# Patient Record
Sex: FEMALE | Race: WHITE | Employment: FULL TIME | URBAN - METROPOLITAN AREA
[De-identification: names, ages, dates, MRNs, and addresses within clinical notes are randomized per-mention and may not be internally consistent; named-entity substitution may affect disease eponyms.]

---

## 2017-02-10 ENCOUNTER — HOSPITAL ENCOUNTER (OUTPATIENT)
Dept: LAB | Age: 57
Discharge: HOME | End: 2017-02-10
Payer: COMMERCIAL

## 2017-02-10 ENCOUNTER — OFFICE VISIT (OUTPATIENT)
Dept: FAMILY MEDICINE CLINIC | Facility: CLINIC | Age: 57
End: 2017-02-10

## 2017-02-10 VITALS
SYSTOLIC BLOOD PRESSURE: 134 MMHG | BODY MASS INDEX: 42.8 KG/M2 | HEART RATE: 80 BPM | WEIGHT: 282.4 LBS | HEIGHT: 68 IN | TEMPERATURE: 97.7 F | DIASTOLIC BLOOD PRESSURE: 80 MMHG | OXYGEN SATURATION: 99 % | RESPIRATION RATE: 16 BRPM

## 2017-02-10 DIAGNOSIS — E78.5 HYPERLIPIDEMIA, UNSPECIFIED HYPERLIPIDEMIA TYPE: ICD-10-CM

## 2017-02-10 DIAGNOSIS — G89.29 CHRONIC PAIN OF BOTH KNEES: ICD-10-CM

## 2017-02-10 DIAGNOSIS — E11.9 TYPE 2 DIABETES MELLITUS WITHOUT COMPLICATION, WITHOUT LONG-TERM CURRENT USE OF INSULIN (HCC): Primary | ICD-10-CM

## 2017-02-10 DIAGNOSIS — M25.561 CHRONIC PAIN OF BOTH KNEES: ICD-10-CM

## 2017-02-10 DIAGNOSIS — I10 ESSENTIAL HYPERTENSION: ICD-10-CM

## 2017-02-10 DIAGNOSIS — M25.562 CHRONIC PAIN OF BOTH KNEES: ICD-10-CM

## 2017-02-10 DIAGNOSIS — E11.9 TYPE 2 DIABETES MELLITUS WITHOUT COMPLICATION, WITHOUT LONG-TERM CURRENT USE OF INSULIN (HCC): ICD-10-CM

## 2017-02-10 LAB
ALBUMIN SERPL BCP-MCNC: 3.3 G/DL (ref 3.4–5)
ALBUMIN/GLOB SERPL: 0.8 {RATIO} (ref 0.8–1.7)
ALP SERPL-CCNC: 102 U/L (ref 45–117)
ALT SERPL-CCNC: 20 U/L (ref 13–56)
ANION GAP BLD CALC-SCNC: 10 MMOL/L (ref 3–18)
AST SERPL W P-5'-P-CCNC: 15 U/L (ref 15–37)
BASOPHILS # BLD AUTO: 0 K/UL (ref 0–0.06)
BASOPHILS # BLD: 0 % (ref 0–2)
BILIRUB SERPL-MCNC: 0.4 MG/DL (ref 0.2–1)
BUN SERPL-MCNC: 14 MG/DL (ref 7–18)
BUN/CREAT SERPL: 19 (ref 12–20)
CALCIUM SERPL-MCNC: 8.7 MG/DL (ref 8.5–10.1)
CHLORIDE SERPL-SCNC: 102 MMOL/L (ref 100–108)
CHOLEST SERPL-MCNC: 154 MG/DL
CO2 SERPL-SCNC: 26 MMOL/L (ref 21–32)
CREAT SERPL-MCNC: 0.72 MG/DL (ref 0.6–1.3)
DIFFERENTIAL METHOD BLD: ABNORMAL
EOSINOPHIL # BLD: 0.3 K/UL (ref 0–0.4)
EOSINOPHIL NFR BLD: 2 % (ref 0–5)
ERYTHROCYTE [DISTWIDTH] IN BLOOD BY AUTOMATED COUNT: 13.7 % (ref 11.6–14.5)
EST. AVERAGE GLUCOSE BLD GHB EST-MCNC: 157 MG/DL
GLOBULIN SER CALC-MCNC: 4.4 G/DL (ref 2–4)
GLUCOSE SERPL-MCNC: 147 MG/DL (ref 74–99)
HBA1C MFR BLD: 7.1 % (ref 4.2–5.6)
HCT VFR BLD AUTO: 42.8 % (ref 35–45)
HDLC SERPL-MCNC: 43 MG/DL (ref 40–60)
HDLC SERPL: 3.6 {RATIO} (ref 0–5)
HGB BLD-MCNC: 13.8 G/DL (ref 12–16)
LDLC SERPL CALC-MCNC: 92.4 MG/DL (ref 0–100)
LIPID PROFILE,FLP: NORMAL
LYMPHOCYTES # BLD AUTO: 18 % (ref 21–52)
LYMPHOCYTES # BLD: 2.1 K/UL (ref 0.9–3.6)
MCH RBC QN AUTO: 28 PG (ref 24–34)
MCHC RBC AUTO-ENTMCNC: 32.2 G/DL (ref 31–37)
MCV RBC AUTO: 87 FL (ref 74–97)
MONOCYTES # BLD: 0.7 K/UL (ref 0.05–1.2)
MONOCYTES NFR BLD AUTO: 6 % (ref 3–10)
NEUTS SEG # BLD: 8.5 K/UL (ref 1.8–8)
NEUTS SEG NFR BLD AUTO: 74 % (ref 40–73)
PLATELET # BLD AUTO: 369 K/UL (ref 135–420)
PMV BLD AUTO: 10.8 FL (ref 9.2–11.8)
POTASSIUM SERPL-SCNC: 4.6 MMOL/L (ref 3.5–5.5)
PROT SERPL-MCNC: 7.7 G/DL (ref 6.4–8.2)
RBC # BLD AUTO: 4.92 M/UL (ref 4.2–5.3)
RHEUMATOID FACT SER QL LA: NEGATIVE
SODIUM SERPL-SCNC: 138 MMOL/L (ref 136–145)
TRIGL SERPL-MCNC: 93 MG/DL (ref ?–150)
TSH SERPL DL<=0.05 MIU/L-ACNC: 1.61 UIU/ML (ref 0.36–3.74)
VLDLC SERPL CALC-MCNC: 18.6 MG/DL
WBC # BLD AUTO: 11.6 K/UL (ref 4.6–13.2)

## 2017-02-10 PROCEDURE — 86430 RHEUMATOID FACTOR TEST QUAL: CPT | Performed by: FAMILY MEDICINE

## 2017-02-10 PROCEDURE — 36415 COLL VENOUS BLD VENIPUNCTURE: CPT | Performed by: FAMILY MEDICINE

## 2017-02-10 PROCEDURE — 86038 ANTINUCLEAR ANTIBODIES: CPT | Performed by: FAMILY MEDICINE

## 2017-02-10 PROCEDURE — 80053 COMPREHEN METABOLIC PANEL: CPT | Performed by: FAMILY MEDICINE

## 2017-02-10 PROCEDURE — 85025 COMPLETE CBC W/AUTO DIFF WBC: CPT | Performed by: FAMILY MEDICINE

## 2017-02-10 PROCEDURE — 80061 LIPID PANEL: CPT | Performed by: FAMILY MEDICINE

## 2017-02-10 PROCEDURE — 83036 HEMOGLOBIN GLYCOSYLATED A1C: CPT | Performed by: FAMILY MEDICINE

## 2017-02-10 PROCEDURE — 84443 ASSAY THYROID STIM HORMONE: CPT | Performed by: FAMILY MEDICINE

## 2017-02-10 NOTE — PROGRESS NOTES
SUBJECTIVE:  Emelia Mcgowan is a 61y.o. year old female   Chief Complaint   Patient presents with    Knee Pain       History of Present Illness:   Patient is lost to f/u of Hypertension and DM again. She is still not taking her medications regularly. Her glucose is running good when she checks it. She says that her MS is doing well. She has moderate knee pain (Rt>.Lt) when she gets up and walks, for several months. It gets better after walking. No recent injury. No Systemic, Cardiovascular or Respiratory symptoms. Past Medical History   Diagnosis Date    Cephalgia     Hyperlipemia      mild    Hypertension     IFG (impaired fasting glucose)     MS (multiple sclerosis) (HCC)     Optic neuritis     Tendonitis of shoulder      Past Surgical History   Procedure Laterality Date    Hx hysterectomy       fibroids and endometriosis        Current Outpatient Prescriptions   Medication Sig    metoprolol succinate (TOPROL-XL) 100 mg tablet TAKE 1 TABLET BY MOUTH DAILY    lisinopril-hydrochlorothiazide (PRINZIDE, ZESTORETIC) 20-12.5 mg per tablet TAKE 1 TABLET BY MOUTH DAILY    Blood-Glucose Meter monitoring kit Re: DM .00. Check once daily.  glucose blood VI test strips (BLOOD GLUCOSE TEST) strip Re: DM .00. Check once daily.  Lancets misc Re: DM .00. Check once daily.  aspirin delayed-release 81 mg tablet Take  by mouth daily.  Cholecalciferol, Vitamin D3, (VITAMIN D3) 1,000 unit cap Take 1,000 Units by mouth daily.  dimethyl fumarate (TECFIDERA) 240 mg cpDR Take  by mouth two (2) times a day. No current facility-administered medications for this visit.         No Known Allergies     Family History   Problem Relation Age of Onset    Cancer Mother      breast    Diabetes Father     Hypertension Father     Heart Disease Father     Diabetes Sister     Diabetes Brother     Hypertension Sister         Social History   Substance Use Topics    Smoking status: Never Smoker    Smokeless tobacco: Never Used    Alcohol use No       Review of Systems:   Constitutional: No fever, chills, night sweats, malaise, dizziness. Cardiovascular: No angina, palpitations, PND, orthopnea, lightheadedness, edema, claudication. Respiratory: No dyspnea, wheeze, pleurisy, hemoptysis, unusual cough or sputum. Gastrointestinal: No nausea/ vomiting, bowel habit change, pain, MIRELLA symptoms, melena, hematochezia, anorexia. Neurological: No seizures, speech abnormality, incontinence. Psychiatric: No agitation, confusion/disorientation, suicidal or homicidal ideation. Musculoskeletal[de-identified] Gait and Station: gait- normal; station- normal.  Feet: no ulcers, infection, cyanosis. Head & Neck: NC/AT, face is symmetrical, neck is supple. Extremities: no acute joint swelling, heat, effusion, redness; FROM, nontender to motion; no instabilty; normal strength/tone. Endocrine: Denies any complaints. OBJECTIVE:  Physical Exam:   Constitutional: General Appearance:  well developed, execissively obese, nontoxic, in no acute distress. Visit Vitals    /80 (BP 1 Location: Left arm, BP Patient Position: Sitting)    Pulse 80    Temp 97.7 °F (36.5 °C) (Oral)    Resp 16    Ht 5' 8\" (1.727 m)    Wt 282 lb 6.4 oz (128.1 kg)    SpO2 99%    BMI 42.94 kg/m2     Pulmonary: Respiratory effort: normal; no dyspnea, no retractions, no accessory muscle use. Auscultation: normal & symmetrical air exchange; no rales, no rhonchi, no wheeze; no rubs  Cardiovascular: Palpation: PMI not displaced or enlarged, no thrills or heaves. Auscultation: RRR; no murmur, rubs or gallops. Extremities: no edema, no active varicosity. Neck: carotid arteries- normal volume & without bruit; no JVD. Gastrointestinal: Normal bowel sounds. No masses; no tenderness; no rebound/rigidity; no CVA tenderness. No hepatosplenomegaly. Psychiatric: Oriented to time, place and person.    Normal mood, no agitation or anxiety. Normal affect. Pleasant and cooperative. Neurologic: CN I to XII: intact. DTR: symmetrical & normal.  Musculoskeletal: NC/AT. Neck-supple. Lab Results   Component Value Date/Time    TSH 1.50 03/01/2016 04:00 PM     Lab Results   Component Value Date/Time    Cholesterol, total 161 08/30/2016 09:58 AM    HDL Cholesterol 45 08/30/2016 09:58 AM    LDL, calculated 93.4 08/30/2016 09:58 AM    VLDL, calculated 22.6 08/30/2016 09:58 AM    Triglyceride 113 08/30/2016 09:58 AM    CHOL/HDL Ratio 3.6 08/30/2016 09:58 AM     Lab Results   Component Value Date/Time    Hemoglobin A1c 6.7 08/30/2016 09:58 AM    Hemoglobin A1c (POC) 6.8 02/25/2016 09:02 AM     Lab Results   Component Value Date/Time    Sodium 137 08/30/2016 09:58 AM    Potassium 4.4 08/30/2016 09:58 AM    Chloride 102 08/30/2016 09:58 AM    CO2 27 08/30/2016 09:58 AM    Anion gap 8 08/30/2016 09:58 AM    Glucose 132 08/30/2016 09:58 AM    BUN 13 08/30/2016 09:58 AM    Creatinine 0.77 08/30/2016 09:58 AM    BUN/Creatinine ratio 17 08/30/2016 09:58 AM    GFR est AA >60 08/30/2016 09:58 AM    GFR est non-AA >60 08/30/2016 09:58 AM    Calcium 8.8 08/30/2016 09:58 AM    Bilirubin, total 0.3 08/30/2016 09:58 AM    AST (SGOT) 13 08/30/2016 09:58 AM    Alk. phosphatase 106 08/30/2016 09:58 AM    Protein, total 7.7 08/30/2016 09:58 AM    Albumin 3.3 08/30/2016 09:58 AM    Globulin 4.4 08/30/2016 09:58 AM    A-G Ratio 0.8 08/30/2016 09:58 AM    ALT (SGPT) 21 08/30/2016 09:58 AM     Lab Results   Component Value Date/Time    WBC 11.7 08/30/2016 09:58 AM    HGB 13.6 08/30/2016 09:58 AM    HCT 41.9 08/30/2016 09:58 AM    PLATELET 012 15/66/9543 09:58 AM    MCV 86.2 08/30/2016 09:58 AM       ASSESSMENT:     1. Type 2 diabetes mellitus without complication, without long-term current use of insulin ([facility]    2. Essential hypertension    3. Hyperlipidemia, unspecified hyperlipidemia type    4.  Chronic pain of both knees        PLAN: Pharmacologic Management: Medications reviewed with the patient. No change now. Orders Placed This Encounter    HEMOGLOBIN A1C WITH EAG    LIPID PANEL    METABOLIC PANEL, COMPREHENSIVE    CBC WITH AUTOMATED DIFF    TSH 3RD GENERATION    RHEUMATOID FACTOR, QL    ANTINUCLEAR ANTIBODIES, IFA    REFERRAL TO ORTHOPEDICS       Other Instructions: Discussed DDx, follow-up & work-up. Discussed risk/benefit & side effect of treatment. Follow up visit as planned, prn sooner. Low salt ADA diet, weightloss & graduated excecise. Knee care. Health risk from non adherence discussed. Patient voiced understanding. Follow-up Disposition:  Return in about 2 months (around 4/10/2017).     Elier Elizabeth MD

## 2017-02-10 NOTE — PROGRESS NOTES
Merari Walker is a 61 y.o.  female presents today for office visit for follow up. Pt is in Room # 4      1. Have you been to the ER, urgent care clinic since your last visit? Hospitalized since your last visit? No    2. Have you seen or consulted any other health care providers outside of the [address] [place] [address] since your last visit? Include any pap smears or colon screening. No    No Patient Care Coordination Note on file.     Health Maintenance reviewed -

## 2017-02-11 NOTE — PROGRESS NOTES
HgbA1c was higher, in uncontrolled diabetes range. At this level, medication should be considered. The patient is advised to continue with diet and exercise. Check glucose at home. Lipids were in range. Other lab tests were stable for a year. [place]

## 2017-02-13 ENCOUNTER — TELEPHONE (OUTPATIENT)
Dept: FAMILY MEDICINE CLINIC | Facility: CLINIC | Age: 57
End: 2017-02-13

## 2017-02-13 LAB — ANA TITR SER IF: NEGATIVE {TITER}

## 2017-02-13 NOTE — TELEPHONE ENCOUNTER
Called pt and left message. Call back number left and I myself or one of the other nurses will attempt to contact again. The call was to inform pt of lab results. [employer] message sent.

## 2017-02-17 NOTE — TELEPHONE ENCOUNTER
Pt called regarding to lab results . Informed of the 's message. Pt states she is agreeable to starting medication and states she will follow up here.

## 2017-04-14 ENCOUNTER — OFFICE VISIT (OUTPATIENT)
Dept: FAMILY MEDICINE CLINIC | Facility: CLINIC | Age: 57
End: 2017-04-14

## 2017-04-14 VITALS
RESPIRATION RATE: 15 BRPM | SYSTOLIC BLOOD PRESSURE: 140 MMHG | HEART RATE: 67 BPM | OXYGEN SATURATION: 96 % | DIASTOLIC BLOOD PRESSURE: 90 MMHG | TEMPERATURE: 97.8 F | HEIGHT: 68 IN | WEIGHT: 275 LBS | BODY MASS INDEX: 41.68 KG/M2

## 2017-04-14 DIAGNOSIS — I10 ACCELERATED HYPERTENSION: Primary | ICD-10-CM

## 2017-04-14 DIAGNOSIS — G35 MULTIPLE SCLEROSIS (HCC): ICD-10-CM

## 2017-04-14 DIAGNOSIS — E78.5 HYPERLIPIDEMIA, UNSPECIFIED HYPERLIPIDEMIA TYPE: ICD-10-CM

## 2017-04-14 DIAGNOSIS — E11.9 TYPE 2 DIABETES MELLITUS WITHOUT COMPLICATION, WITHOUT LONG-TERM CURRENT USE OF INSULIN (HCC): ICD-10-CM

## 2017-04-14 NOTE — PROGRESS NOTES
SUBJECTIVE:  Isaias Fung is a 61y.o. year old female   Chief Complaint   Patient presents with    Hypertension       History of Present Illness:     Patient went to her dentist yesterday for root canal therapy. The treatment was cancelled because her BP was as high as 219/117. She missed her BP medications several days before she went to the dentist.  She was still not taking her medications regularly since last OV 2 months ago. She took the meds yesterday and today. She denies any neurologic, respiratory or hemodynamic symptoms. Her glucose is running good when she checks it. She says that her MS is doing well; but she is not taking her MS medication as prescribed. .  She has seen orthopedist for her knee pain and is encouraged to follow up. No Systemic, Cardiovascular or Respiratory symptoms. Past Medical History:   Diagnosis Date    Cephalgia     Hyperlipemia     mild    Hypertension     IFG (impaired fasting glucose)     MS (multiple sclerosis) (HCC)     Optic neuritis     Tendonitis of shoulder      Past Surgical History:   Procedure Laterality Date    HX HYSTERECTOMY      fibroids and endometriosis        Current Outpatient Prescriptions   Medication Sig    metoprolol succinate (TOPROL-XL) 100 mg tablet TAKE 1 TABLET BY MOUTH DAILY    lisinopril-hydrochlorothiazide (PRINZIDE, ZESTORETIC) 20-12.5 mg per tablet TAKE 1 TABLET BY MOUTH DAILY    Blood-Glucose Meter monitoring kit Re: DM .00. Check once daily.  glucose blood VI test strips (BLOOD GLUCOSE TEST) strip Re: DM .00. Check once daily.  Lancets misc Re: DM .00. Check once daily.  Cholecalciferol, Vitamin D3, (VITAMIN D3) 1,000 unit cap Take 1,000 Units by mouth daily.  dimethyl fumarate (TECFIDERA) 240 mg cpDR Take  by mouth two (2) times a day.  aspirin delayed-release 81 mg tablet Take  by mouth daily. No current facility-administered medications for this visit.         No Known Allergies     Family History   Problem Relation Age of Onset    Cancer Mother      breast    Diabetes Father     Hypertension Father     Heart Disease Father     Diabetes Sister     Diabetes Brother     Hypertension Sister         Social History   Substance Use Topics    Smoking status: Never Smoker    Smokeless tobacco: Never Used    Alcohol use No       Review of Systems:   Constitutional: No fever, chills, night sweats, malaise, dizziness. Cardiovascular: No angina, palpitations, PND, orthopnea, lightheadedness, edema, claudication. Respiratory: No dyspnea, wheeze, pleurisy, hemoptysis, unusual cough or sputum. Gastrointestinal: No nausea/ vomiting, bowel habit change, pain, MIRELLA symptoms, melena, hematochezia, anorexia. Neurological: No seizures, speech abnormality, incontinence. Psychiatric: No agitation, confusion/disorientation, suicidal or homicidal ideation. Musculoskeletal[de-identified] denies any other complaints. Endocrine: Denies any complaints. OBJECTIVE:  Physical Exam:   Constitutional: General Appearance:  well developed, execissively obese, nontoxic, in no acute distress. Visit Vitals    /90 (BP 1 Location: Left arm, BP Patient Position: Sitting)    Pulse 67    Temp 97.8 °F (36.6 °C) (Oral)    Resp 15    Ht 5' 8\" (1.727 m)    Wt 275 lb (124.7 kg)    SpO2 96%    BMI 41.81 kg/m2     Pulmonary: Respiratory effort: normal; no dyspnea, no retractions, no accessory muscle use. Auscultation: normal & symmetrical air exchange; no rales, no rhonchi, no wheeze; no rubs    Cardiovascular: Palpation: PMI not displaced or enlarged, no thrills or heaves. Auscultation: RRR; no murmur, rubs or gallops. Extremities: no edema, no active varicosity. N nithya: carotid arteries- normal volume & without bruit; no JVD. Gastrointestinal: Normal bowel sounds. No masses; no tenderness; no rebound/rigidity; no CVA tenderness. No hepatosplenomegaly.     Psychiatric: Oriented to time, place and person. Normal mood, no agitation or anxiety. Normal affect. Pleasant and cooperative. Neurologic: CN I to XII: intact. DTR: symmetrical & normal.  No focal weakness. Musculoskeletal: NC/AT. Neck-supple. Lab Results   Component Value Date/Time    TSH 1.61 02/10/2017 09:46 AM     Lab Results   Component Value Date/Time    Cholesterol, total 154 02/10/2017 09:46 AM    HDL Cholesterol 43 02/10/2017 09:46 AM    LDL, calculated 92.4 02/10/2017 09:46 AM    VLDL, calculated 18.6 02/10/2017 09:46 AM    Triglyceride 93 02/10/2017 09:46 AM    CHOL/HDL Ratio 3.6 02/10/2017 09:46 AM     Lab Results   Component Value Date/Time    Hemoglobin A1c 7.1 02/10/2017 09:46 AM    Hemoglobin A1c (POC) 6.8 02/25/2016 09:02 AM     Lab Results   Component Value Date/Time    Sodium 138 02/10/2017 09:46 AM    Potassium 4.6 02/10/2017 09:46 AM    Chloride 102 02/10/2017 09:46 AM    CO2 26 02/10/2017 09:46 AM    Anion gap 10 02/10/2017 09:46 AM    Glucose 147 02/10/2017 09:46 AM    BUN 14 02/10/2017 09:46 AM    Creatinine 0.72 02/10/2017 09:46 AM    BUN/Creatinine ratio 19 02/10/2017 09:46 AM    GFR est AA >60 02/10/2017 09:46 AM    GFR est non-AA >60 02/10/2017 09:46 AM    Calcium 8.7 02/10/2017 09:46 AM    Bilirubin, total 0.4 02/10/2017 09:46 AM    AST (SGOT) 15 02/10/2017 09:46 AM    Alk. phosphatase 102 02/10/2017 09:46 AM    Protein, total 7.7 02/10/2017 09:46 AM    Albumin 3.3 02/10/2017 09:46 AM    Globulin 4.4 02/10/2017 09:46 AM    A-G Ratio 0.8 02/10/2017 09:46 AM    ALT (SGPT) 20 02/10/2017 09:46 AM     Lab Results   Component Value Date/Time    WBC 11.6 02/10/2017 09:46 AM    HGB 13.8 02/10/2017 09:46 AM    HCT 42.8 02/10/2017 09:46 AM    PLATELET 644 36/74/1637 09:46 AM    MCV 87.0 02/10/2017 09:46 AM     EKG today: NSR, q in inferior leads, ~WNL; no change since 10/19/2011    ASSESSMENT:     1. Accelerated hypertension    2.  Type 2 diabetes mellitus without complication, without long-term current use of insulin ([facility]    3. Hyperlipidemia, unspecified hyperlipidemia type    4. Multiple sclerosis ([facility]        PLAN:       Pharmacologic Management: Medications reviewed with the patient. Take the medications regularly    Orders Placed This Encounter    AMB POC EKG ROUTINE W/ 12 LEADS, INTER & REP     Other Instructions: Discussed DDx, follow-up & work-up. Discussed risk/benefit & side effect of treatment. Follow up visit as planned, prn sooner. Low salt ADA diet, weightloss & graduated excecise. Health risk from non adherence discussed again. Patient voiced understanding. Follow-up Disposition:  Return in about 1 week (around 4/21/2017).     Jess Paige MD

## 2017-04-14 NOTE — PROGRESS NOTES
Haroldine Severe is a 61 y.o.  female presents today for office visit for follow up. Pt is in Room # 5      1. Have you been to the ER, urgent care clinic since your last visit? Hospitalized since your last visit? No    2. Have you seen or consulted any other health care providers outside of the [address] [place] [address] since your last visit? Include any pap smears or colon screening. Orthopedic Feb, urologist     No Patient Care Coordination Note on file.

## 2017-04-19 ENCOUNTER — TELEPHONE (OUTPATIENT)
Dept: FAMILY MEDICINE CLINIC | Facility: CLINIC | Age: 57
End: 2017-04-19

## 2017-04-19 NOTE — TELEPHONE ENCOUNTER
Called pt and left message. Call back number left and I myself or one of the other nurses will attempt to contact again. The call was to inform pt  about needing to make an apt on Friday instead of Monday or will not be cleared for surgery if blood pressure is elevated.

## 2017-04-24 ENCOUNTER — OFFICE VISIT (OUTPATIENT)
Dept: FAMILY MEDICINE CLINIC | Facility: CLINIC | Age: 57
End: 2017-04-24

## 2017-04-24 VITALS
OXYGEN SATURATION: 98 % | DIASTOLIC BLOOD PRESSURE: 86 MMHG | SYSTOLIC BLOOD PRESSURE: 138 MMHG | HEIGHT: 68 IN | WEIGHT: 276 LBS | HEART RATE: 76 BPM | RESPIRATION RATE: 17 BRPM | BODY MASS INDEX: 41.83 KG/M2 | TEMPERATURE: 97.5 F

## 2017-04-24 DIAGNOSIS — E11.9 TYPE 2 DIABETES MELLITUS WITHOUT COMPLICATION, WITHOUT LONG-TERM CURRENT USE OF INSULIN (HCC): ICD-10-CM

## 2017-04-24 DIAGNOSIS — I10 ESSENTIAL HYPERTENSION: Primary | ICD-10-CM

## 2017-04-24 NOTE — PROGRESS NOTES
Marcos Garcia is a 61 y.o.  female presents today for office visit for hypertension. Pt is in Room # 4.    1. Have you been to the ER, urgent care clinic since your last visit? Hospitalized since your last visit? No    2. Have you seen or consulted any other health care providers outside of the [address] [place] [address] since your last visit? Include any pap smears or colon screening.  No    Upcoming Appointments:    [facility] Thursday  Spine Imaging  - Dr. Myriam Fletcher

## 2017-04-24 NOTE — PROGRESS NOTES
SUBJECTIVE:  Dorothy Berg is a 61y.o. year old female   Chief Complaint   Patient presents with    Hypertension       History of Present Illness:     Patient went to her dentist on 4/13/17 for root canal therapy. The treatment was cancelled because her BP was as high as 219/117. She missed her BP medications several days before she went to the dentist.  She has been taking her medications since. Her root canal therapy is no longer needed; she will have tooth extraction due to cracked tooth. She denies any neurologic, respiratory or hemodynamic symptoms. Her glucose is stiil running good when she checks it. No Systemic, Cardiovascular or Respiratory symptoms. Past Medical History:   Diagnosis Date    Cephalgia     Hyperlipemia     mild    Hypertension     IFG (impaired fasting glucose)     MS (multiple sclerosis) (HCC)     Optic neuritis     Tendonitis of shoulder      Past Surgical History:   Procedure Laterality Date    HX HYSTERECTOMY      fibroids and endometriosis        Current Outpatient Prescriptions   Medication Sig    metoprolol succinate (TOPROL-XL) 100 mg tablet TAKE 1 TABLET BY MOUTH DAILY    lisinopril-hydrochlorothiazide (PRINZIDE, ZESTORETIC) 20-12.5 mg per tablet TAKE 1 TABLET BY MOUTH DAILY    Blood-Glucose Meter monitoring kit Re: DM .00. Check once daily.  glucose blood VI test strips (BLOOD GLUCOSE TEST) strip Re: DM .00. Check once daily.  Lancets misc Re: DM .00. Check once daily.  dimethyl fumarate (TECFIDERA) 240 mg cpDR Take  by mouth two (2) times a day.  aspirin delayed-release 81 mg tablet Take  by mouth daily.  Cholecalciferol, Vitamin D3, (VITAMIN D3) 1,000 unit cap Take 1,000 Units by mouth daily. No current facility-administered medications for this visit.         No Known Allergies     Family History   Problem Relation Age of Onset    Cancer Mother      breast    Diabetes Father     Hypertension Father     Heart Disease Father     Diabetes Sister     Diabetes Brother     Hypertension Sister         Social History   Substance Use Topics    Smoking status: Never Smoker    Smokeless tobacco: Never Used    Alcohol use No       Review of Systems:   Constitutional: No fever, chills, night sweats, malaise, dizziness. Cardiovascular: No angina, palpitations, PND, orthopnea, lightheadedness, edema, claudication. Respiratory: No dyspnea, wheeze, pleurisy, hemoptysis, unusual cough or sputum. Gastrointestinal: No nausea/ vomiting, bowel habit change, pain, MIRELLA symptoms, melena, hematochezia, anorexia. Neurological: No seizures, speech abnormality, incontinence. Psychiatric: No agitation, confusion/disorientation, suicidal or homicidal ideation. Musculoskeletal[de-identified] denies any other complaints. Endocrine: Denies any complaints. OBJECTIVE:  Physical Exam:   Constitutional: General Appearance:  well developed, execissively obese, nontoxic, in no acute distress. Visit Vitals    /88 (BP 1 Location: Right arm, BP Patient Position: Sitting)    Pulse 76    Temp 97.5 °F (36.4 °C) (Oral)    Resp 17    Ht 5' 8\" (1.727 m)    Wt 276 lb (125.2 kg)    SpO2 98%    BMI 41.97 kg/m2     Pulmonary: Respiratory effort: normal; no dyspnea, no retractions, no accessory muscle use. Auscultation: normal & symmetrical air exchange; no rales, no rhonchi, no wheeze; no rubs    Cardiovascular: Palpation: PMI not displaced or enlarged, no thrills or heaves. Auscultation: RRR; no murmur, rubs or gallops. Extremities: no edema, no active varicosity. N nithya: carotid arteries- normal volume & without bruit; no JVD. Gastrointestinal: Normal bowel sounds. No masses; no tenderness; no rebound/rigidity; no CVA tenderness. No hepatosplenomegaly. Psychiatric: Oriented to time, place and person. Normal mood, no agitation or anxiety. Normal affect. Pleasant and cooperative. Neurologic: CN I to XII: intact. DTR: symmetrical & normal.  No focal weakness. Musculoskeletal: NC/AT. Neck-supple. Lab Results   Component Value Date/Time    TSH 1.61 02/10/2017 09:46 AM     Lab Results   Component Value Date/Time    Cholesterol, total 154 02/10/2017 09:46 AM    HDL Cholesterol 43 02/10/2017 09:46 AM    LDL, calculated 92.4 02/10/2017 09:46 AM    VLDL, calculated 18.6 02/10/2017 09:46 AM    Triglyceride 93 02/10/2017 09:46 AM    CHOL/HDL Ratio 3.6 02/10/2017 09:46 AM     Lab Results   Component Value Date/Time    Hemoglobin A1c 7.1 02/10/2017 09:46 AM    Hemoglobin A1c (POC) 6.8 02/25/2016 09:02 AM     Lab Results   Component Value Date/Time    Sodium 138 02/10/2017 09:46 AM    Potassium 4.6 02/10/2017 09:46 AM    Chloride 102 02/10/2017 09:46 AM    CO2 26 02/10/2017 09:46 AM    Anion gap 10 02/10/2017 09:46 AM    Glucose 147 02/10/2017 09:46 AM    BUN 14 02/10/2017 09:46 AM    Creatinine 0.72 02/10/2017 09:46 AM    BUN/Creatinine ratio 19 02/10/2017 09:46 AM    GFR est AA >60 02/10/2017 09:46 AM    GFR est non-AA >60 02/10/2017 09:46 AM    Calcium 8.7 02/10/2017 09:46 AM    Bilirubin, total 0.4 02/10/2017 09:46 AM    AST (SGOT) 15 02/10/2017 09:46 AM    Alk. phosphatase 102 02/10/2017 09:46 AM    Protein, total 7.7 02/10/2017 09:46 AM    Albumin 3.3 02/10/2017 09:46 AM    Globulin 4.4 02/10/2017 09:46 AM    A-G Ratio 0.8 02/10/2017 09:46 AM    ALT (SGPT) 20 02/10/2017 09:46 AM     Lab Results   Component Value Date/Time    WBC 11.6 02/10/2017 09:46 AM    HGB 13.8 02/10/2017 09:46 AM    HCT 42.8 02/10/2017 09:46 AM    PLATELET 760 28/25/4960 09:46 AM    MCV 87.0 02/10/2017 09:46 AM     EKG 4/14/17: NSR, q in inferior leads, ~WNL; no change since 10/19/2011    ASSESSMENT:     1. Essential hypertension    2. Type 2 diabetes mellitus without complication, without long-term current use of insulin (HCC)        PLAN:     Pharmacologic Management: Medications reviewed with the patient.   Take the medications regularly    Discussed DDx, follow-up & work-up. Discussed risk/benefit & side effect of treatment. Follow up visit as planned, prn sooner. Low salt ADA diet, weightloss & graduated excecise. Health risk from non adherence discussed again. Patient voiced understanding. Follow-up Disposition:  Return in about 1 month (around 5/24/2017).     Sridhar Hammond MD

## 2017-06-30 ENCOUNTER — HOSPITAL ENCOUNTER (OUTPATIENT)
Dept: LAB | Age: 57
Discharge: HOME | End: 2017-06-30
Payer: COMMERCIAL

## 2017-06-30 ENCOUNTER — OFFICE VISIT (OUTPATIENT)
Dept: FAMILY MEDICINE CLINIC | Facility: CLINIC | Age: 57
End: 2017-06-30

## 2017-06-30 VITALS
HEIGHT: 68 IN | BODY MASS INDEX: 41.22 KG/M2 | HEART RATE: 73 BPM | RESPIRATION RATE: 18 BRPM | DIASTOLIC BLOOD PRESSURE: 82 MMHG | SYSTOLIC BLOOD PRESSURE: 134 MMHG | OXYGEN SATURATION: 98 % | WEIGHT: 272 LBS | TEMPERATURE: 97.9 F

## 2017-06-30 DIAGNOSIS — I10 ESSENTIAL HYPERTENSION: Primary | ICD-10-CM

## 2017-06-30 DIAGNOSIS — I10 ESSENTIAL HYPERTENSION: ICD-10-CM

## 2017-06-30 DIAGNOSIS — E11.9 TYPE 2 DIABETES MELLITUS WITHOUT COMPLICATION, WITHOUT LONG-TERM CURRENT USE OF INSULIN (HCC): ICD-10-CM

## 2017-06-30 DIAGNOSIS — Z12.12 SCREENING FOR COLORECTAL CANCER: ICD-10-CM

## 2017-06-30 DIAGNOSIS — G35 MULTIPLE SCLEROSIS (HCC): ICD-10-CM

## 2017-06-30 DIAGNOSIS — E78.5 HYPERLIPIDEMIA, UNSPECIFIED HYPERLIPIDEMIA TYPE: ICD-10-CM

## 2017-06-30 DIAGNOSIS — M67.40 GANGLION CYST: ICD-10-CM

## 2017-06-30 DIAGNOSIS — Z12.11 SCREENING FOR COLORECTAL CANCER: ICD-10-CM

## 2017-06-30 LAB
ANION GAP BLD CALC-SCNC: 9 MMOL/L (ref 3–18)
BUN SERPL-MCNC: 20 MG/DL (ref 7–18)
BUN/CREAT SERPL: 27 (ref 12–20)
CALCIUM SERPL-MCNC: 9 MG/DL (ref 8.5–10.1)
CHLORIDE SERPL-SCNC: 99 MMOL/L (ref 100–108)
CO2 SERPL-SCNC: 29 MMOL/L (ref 21–32)
CREAT SERPL-MCNC: 0.74 MG/DL (ref 0.6–1.3)
EST. AVERAGE GLUCOSE BLD GHB EST-MCNC: 148 MG/DL
GLUCOSE SERPL-MCNC: 130 MG/DL (ref 74–99)
HBA1C MFR BLD: 6.8 % (ref 4.2–5.6)
POTASSIUM SERPL-SCNC: 4.6 MMOL/L (ref 3.5–5.5)
SODIUM SERPL-SCNC: 137 MMOL/L (ref 136–145)

## 2017-06-30 PROCEDURE — 80048 BASIC METABOLIC PNL TOTAL CA: CPT | Performed by: FAMILY MEDICINE

## 2017-06-30 PROCEDURE — 36415 COLL VENOUS BLD VENIPUNCTURE: CPT | Performed by: FAMILY MEDICINE

## 2017-06-30 PROCEDURE — 83036 HEMOGLOBIN GLYCOSYLATED A1C: CPT | Performed by: FAMILY MEDICINE

## 2017-06-30 NOTE — PROGRESS NOTES
Duarte David is a 61 y.o. female presents today for follow-up. Depression Screening: Completed    1. Have you been to the ER, urgent care clinic since your last visit? Hospitalized since your last visit? No    2. Have you seen or consulted any other health care providers outside of the [address] [place] [address] since your last visit? Include any pap smears or colon screening. Yes yaneth escobar,       [facility] reviewed.

## 2017-06-30 NOTE — PROGRESS NOTES
SUBJECTIVE:  Ivy Mills is a 61y.o. year old female   Chief Complaint   Patient presents with    Hypertension    Cholesterol Problem    Diabetes       History of Present Illness:     Patient went to her dentist on 4/13/17 for root canal therapy. The treatment was cancelled because her BP was as high as 219/117. She missed her BP medications several days before she went to the dentist.  She has been taking her medications since. She denies any new neurologic, respiratory or hemodynamic symptoms. She is seeing neurology and physiatry for MS and pain. Her glucose is stiil running good when she checks it. She has a non-tender small lump in the back of her Lt hand for few years, that waxes and wanes w/o net growth. No Systemic, Cardiovascular or Respiratory symptoms. Past Medical History:   Diagnosis Date    Cephalgia     Hyperlipemia     mild    Hypertension     IFG (impaired fasting glucose)     MS (multiple sclerosis) (HCC)     Optic neuritis     Tendonitis of shoulder      Past Surgical History:   Procedure Laterality Date    HX HYSTERECTOMY      fibroids and endometriosis        Current Outpatient Prescriptions   Medication Sig    metoprolol succinate (TOPROL-XL) 100 mg tablet TAKE 1 TABLET BY MOUTH DAILY    lisinopril-hydrochlorothiazide (PRINZIDE, ZESTORETIC) 20-12.5 mg per tablet TAKE 1 TABLET BY MOUTH DAILY    Blood-Glucose Meter monitoring kit Re: DM .00. Check once daily.  glucose blood VI test strips (BLOOD GLUCOSE TEST) strip Re: DM .00. Check once daily.  Lancets misc Re: DM .00. Check once daily.  dimethyl fumarate (TECFIDERA) 240 mg cpDR Take  by mouth two (2) times a day.  aspirin delayed-release 81 mg tablet Take  by mouth daily.  Cholecalciferol, Vitamin D3, (VITAMIN D3) 1,000 unit cap Take 1,000 Units by mouth daily. No current facility-administered medications for this visit.         No Known Allergies     Family History Problem Relation Age of Onset    Cancer Mother      breast    Diabetes Father     Hypertension Father     Heart Disease Father     Diabetes Sister     Diabetes Brother     Hypertension Sister         Social History   Substance Use Topics    Smoking status: Never Smoker    Smokeless tobacco: Never Used    Alcohol use No       Review of Systems:   Constitutional: No fever, chills, night sweats, malaise, dizziness. Cardiovascular: No angina, palpitations, PND, orthopnea, lightheadedness, edema, claudication. Respiratory: No dyspnea, wheeze, pleurisy, hemoptysis, unusual cough or sputum. Gastrointestinal: No nausea/ vomiting, bowel habit change, pain, MIRELLA symptoms, melena, hematochezia, anorexia. Neurological: No seizures, speech abnormality, incontinence. Psychiatric: No agitation, confusion/disorientation, suicidal or homicidal ideation. Musculoskeletal[de-identified] denies any other complaints. Endocrine: Denies any complaints. OBJECTIVE:  Physical Exam:   Constitutional: General Appearance:  well developed, execissively obese, nontoxic, in no acute distress. Visit Vitals    /82 (BP 1 Location: Right arm, BP Patient Position: Sitting)    Pulse 73    Temp 97.9 °F (36.6 °C) (Oral)    Resp 18    Ht 5' 8\" (1.727 m)    Wt 272 lb (123.4 kg)    SpO2 98%    BMI 41.36 kg/m2     Pulmonary: Respiratory effort: normal; no dyspnea, no retractions, no accessory muscle use. Auscultation: normal & symmetrical air exchange; no rales, no rhonchi, no wheeze; no rubs    Cardiovascular: Palpation: PMI not displaced or enlarged, no thrills or heaves. Auscultation: RRR; no murmur, rubs or gallops. Extremities: no edema, no active varicosity. N nithya: carotid arteries- normal volume & without bruit; no JVD. Gastrointestinal: Normal bowel sounds. No masses; no tenderness; no rebound/rigidity; no CVA tenderness. No hepatosplenomegaly. Psychiatric: Oriented to time, place and person. Normal mood, no agitation or anxiety. Normal affect. Pleasant and cooperative. Neurologic: CN I to XII: intact. DTR: symmetrical & normal.  No focal weakness. Musculoskeletal: NC/AT. Neck-supple. Small cystic mass on  extensor tendon of Lt hand metacarpal area. No induration, heat, redness, tenderness. Diabetic foot exam:     Left: Inspection: No ulcer    Filament test normal sensation with micro filament   Pulse DP: 2+ (normal)   Pulse PT: 2+ (normal)   Deformities: Mild - small bunion  Right: Inspection: No ulcer    Filament test normal sensation with micro filament   Pulse DP: 2+ (normal)   Pulse PT: 2+ (normal)   Deformities: Mild - small bunion    Lab Results   Component Value Date/Time    TSH 1.61 02/10/2017 09:46 AM     Lab Results   Component Value Date/Time    Cholesterol, total 154 02/10/2017 09:46 AM    HDL Cholesterol 43 02/10/2017 09:46 AM    LDL, calculated 92.4 02/10/2017 09:46 AM    VLDL, calculated 18.6 02/10/2017 09:46 AM    Triglyceride 93 02/10/2017 09:46 AM    CHOL/HDL Ratio 3.6 02/10/2017 09:46 AM     Lab Results   Component Value Date/Time    Hemoglobin A1c 7.1 02/10/2017 09:46 AM    Hemoglobin A1c (POC) 6.8 02/25/2016 09:02 AM     Lab Results   Component Value Date/Time    Sodium 138 02/10/2017 09:46 AM    Potassium 4.6 02/10/2017 09:46 AM    Chloride 102 02/10/2017 09:46 AM    CO2 26 02/10/2017 09:46 AM    Anion gap 10 02/10/2017 09:46 AM    Glucose 147 02/10/2017 09:46 AM    BUN 14 02/10/2017 09:46 AM    Creatinine 0.72 02/10/2017 09:46 AM    BUN/Creatinine ratio 19 02/10/2017 09:46 AM    GFR est AA >60 02/10/2017 09:46 AM    GFR est non-AA >60 02/10/2017 09:46 AM    Calcium 8.7 02/10/2017 09:46 AM    Bilirubin, total 0.4 02/10/2017 09:46 AM    AST (SGOT) 15 02/10/2017 09:46 AM    Alk.  phosphatase 102 02/10/2017 09:46 AM    Protein, total 7.7 02/10/2017 09:46 AM    Albumin 3.3 02/10/2017 09:46 AM    Globulin 4.4 02/10/2017 09:46 AM    A-G Ratio 0.8 02/10/2017 09:46 AM    ALT (SGPT) 20 02/10/2017 09:46 AM     Lab Results   Component Value Date/Time    WBC 11.6 02/10/2017 09:46 AM    HGB 13.8 02/10/2017 09:46 AM    HCT 42.8 02/10/2017 09:46 AM    PLATELET 988 29/74/8949 09:46 AM    MCV 87.0 02/10/2017 09:46 AM     EKG 4/14/17: NSR, q in inferior leads, ~WNL; no change since 10/19/2011. ASSESSMENT:     1. Essential hypertension    2. Hyperlipidemia, unspecified hyperlipidemia type    3. Type 2 diabetes mellitus without complication, without long-term current use of insulin ([facility]    4. Multiple sclerosis ([facility]    5. Ganglion cyst    6. Screening for colorectal cancer        PLAN:     Pharmacologic Management: Medications reviewed with the patient. Take the medications regularly. She declines a Statin and antidiabetic medication. Orders Placed This Encounter    METABOLIC PANEL, BASIC    HEMOGLOBIN A1C WITH EAG    REFERRAL FOR COLONOSCOPY    diph,Nicole Clinton,,Tet Vac-PF (ADACEL) 2 Lf-(2.5-5-3-5 mcg)-5Lf/0.5 mL susp    pneumococcal 23-valent (PNEUMOVAX 23) 25 mcg/0.5 mL injection     Discussed DDx, follow-up & work-up. Discussed risk/benefit & side effect of treatment. Follow up visit as planned, prn sooner. Declines referral to hand surgeon. Low salt ADA diet, weightloss & graduated excecise. Health risk from non adherence discussed again. Patient voiced understanding. Follow-up Disposition:  Return in about 3 months (around 9/30/2017).     Beatris Kumari MD

## 2017-07-03 NOTE — PROGRESS NOTES
HgbA1c is stable, now in target. The patient is advised to continue with diet and exercise as tolerated. BW also shows need for better fluid intake.

## 2017-07-05 ENCOUNTER — TELEPHONE (OUTPATIENT)
Dept: FAMILY MEDICINE CLINIC | Facility: CLINIC | Age: 57
End: 2017-07-05

## 2017-07-05 NOTE — TELEPHONE ENCOUNTER
HgbA1c is stable, now in target.  The patient is advised to continue with diet and exercise as tolerated.  BW also shows need for better fluid intake    Spoke with pt in regards to results. Pt acknowledges understanding and voices no concerns at this time.

## 2017-10-10 ENCOUNTER — HOSPITAL ENCOUNTER (OUTPATIENT)
Dept: LAB | Age: 57
Discharge: HOME | End: 2017-10-10
Payer: COMMERCIAL

## 2017-10-10 ENCOUNTER — OFFICE VISIT (OUTPATIENT)
Dept: FAMILY MEDICINE CLINIC | Facility: CLINIC | Age: 57
End: 2017-10-10

## 2017-10-10 VITALS
SYSTOLIC BLOOD PRESSURE: 160 MMHG | WEIGHT: 277 LBS | RESPIRATION RATE: 16 BRPM | DIASTOLIC BLOOD PRESSURE: 90 MMHG | HEART RATE: 68 BPM | HEIGHT: 68 IN | OXYGEN SATURATION: 97 % | TEMPERATURE: 98.4 F | BODY MASS INDEX: 41.98 KG/M2

## 2017-10-10 DIAGNOSIS — E11.9 TYPE 2 DIABETES MELLITUS WITHOUT COMPLICATION, WITHOUT LONG-TERM CURRENT USE OF INSULIN (HCC): ICD-10-CM

## 2017-10-10 DIAGNOSIS — Z23 ENCOUNTER FOR IMMUNIZATION: ICD-10-CM

## 2017-10-10 DIAGNOSIS — I10 ESSENTIAL HYPERTENSION: Primary | ICD-10-CM

## 2017-10-10 DIAGNOSIS — I10 ESSENTIAL HYPERTENSION: ICD-10-CM

## 2017-10-10 DIAGNOSIS — G35 MULTIPLE SCLEROSIS (HCC): ICD-10-CM

## 2017-10-10 LAB
APPEARANCE UR: CLEAR
BILIRUB UR QL: NEGATIVE
COLOR UR: YELLOW
CREAT UR-MCNC: 91.85 MG/DL (ref 30–125)
GLUCOSE UR STRIP.AUTO-MCNC: NEGATIVE MG/DL
HBA1C MFR BLD HPLC: 6.3 %
HGB UR QL STRIP: NEGATIVE
KETONES UR QL STRIP.AUTO: NEGATIVE MG/DL
LEUKOCYTE ESTERASE UR QL STRIP.AUTO: NEGATIVE
MICROALBUMIN UR-MCNC: 0.7 MG/DL (ref 0–3)
MICROALBUMIN/CREAT UR-RTO: 8 MG/G (ref 0–30)
NITRITE UR QL STRIP.AUTO: NEGATIVE
PH UR STRIP: 6 [PH] (ref 5–8)
PROT UR STRIP-MCNC: NEGATIVE MG/DL
SP GR UR REFRACTOMETRY: 1.02 (ref 1–1.03)
UROBILINOGEN UR QL STRIP.AUTO: 1 EU/DL (ref 0.2–1)

## 2017-10-10 PROCEDURE — 82043 UR ALBUMIN QUANTITATIVE: CPT | Performed by: FAMILY MEDICINE

## 2017-10-10 PROCEDURE — 81003 URINALYSIS AUTO W/O SCOPE: CPT | Performed by: FAMILY MEDICINE

## 2017-10-10 RX ORDER — LISINOPRIL 40 MG/1
40 TABLET ORAL DAILY
Qty: 90 TAB | Refills: 1 | Status: SHIPPED | OUTPATIENT
Start: 2017-10-10 | End: 2018-05-03 | Stop reason: SDUPTHER

## 2017-10-10 RX ORDER — METOPROLOL SUCCINATE 100 MG/1
TABLET, EXTENDED RELEASE ORAL
Qty: 90 TAB | Refills: 1 | Status: SHIPPED | OUTPATIENT
Start: 2017-10-10 | End: 2018-05-03 | Stop reason: SDUPTHER

## 2017-10-10 RX ORDER — LISINOPRIL AND HYDROCHLOROTHIAZIDE 12.5; 2 MG/1; MG/1
TABLET ORAL
Qty: 90 TAB | Refills: 0 | Status: CANCELLED | OUTPATIENT
Start: 2017-10-10

## 2017-10-10 RX ORDER — HYDROCHLOROTHIAZIDE 12.5 MG/1
12.5 TABLET ORAL DAILY
Qty: 90 TAB | Refills: 1 | Status: SHIPPED | OUTPATIENT
Start: 2017-10-10 | End: 2018-05-03 | Stop reason: SDUPTHER

## 2017-10-10 NOTE — PATIENT INSTRUCTIONS
Vaccine Information Statement    Influenza (Flu) Vaccine (Inactivated or Recombinant): What you need to know    Many Vaccine Information Statements are available in [place] and other languages. See www.immunize.org/vis  Hojas de Información Sobre Vacunas están disponibles en Español y en muchos otros idiomas. Visite www.immunize.org/vis    1. Why get vaccinated? Influenza (flu) is a contagious disease that spreads around the [place] every year, usually between October and May. Flu is caused by influenza viruses, and is spread mainly by coughing, sneezing, and close contact. Anyone can get flu. Flu strikes suddenly and can last several days. Symptoms vary by age, but can include:   fever/chills   sore throat   muscle aches   fatigue   cough   headache    runny or stuffy nose    Flu can also lead to pneumonia and blood infections, and cause diarrhea and seizures in children. If you have a medical condition, such as heart or lung disease, flu can make it worse. Flu is more dangerous for some people. Infants and young children, people 72years of age and older, pregnant women, and people with certain health conditions or a weakened immune system are at greatest risk. Each year thousands of people in the [facility] die from flu, and many more are hospitalized. Flu vaccine can:   keep you from getting flu,   make flu less severe if you do get it, and   keep you from spreading flu to your family and other people. 2. Inactivated and recombinant flu vaccines    A dose of flu vaccine is recommended every flu season. Children 6 months through 6years of age may need two doses during the same flu season. Everyone else needs only one dose each flu season.        Some inactivated flu vaccines contain a very small amount of a mercury-based preservative called thimerosal. Studies have not shown thimerosal in vaccines to be harmful, but flu vaccines that do not contain thimerosal are available. There is no live flu virus in flu shots. They cannot cause the flu. There are many flu viruses, and they are always changing. Each year a new flu vaccine is made to protect against three or four viruses that are likely to cause disease in the upcoming flu season. But even when the vaccine doesnt exactly match these viruses, it may still provide some protection    Flu vaccine cannot prevent:   flu that is caused by a virus not covered by the vaccine, or   illnesses that look like flu but are not. It takes about 2 weeks for protection to develop after vaccination, and protection lasts through the flu season. 3. Some people should not get this vaccine    Tell the person who is giving you the vaccine:     If you have any severe, life-threatening allergies. If you ever had a life-threatening allergic reaction after a dose of flu vaccine, or have a severe allergy to any part of this vaccine, you may be advised not to get vaccinated. Most, but not all, types of flu vaccine contain a small amount of egg protein.  If you ever had Guillain-Barré Syndrome (also called GBS). Some people with a history of GBS should not get this vaccine. This should be discussed with your doctor.  If you are not feeling well. It is usually okay to get flu vaccine when you have a mild illness, but you might be asked to come back when you feel better. 4. Risks of a vaccine reaction    With any medicine, including vaccines, there is a chance of reactions. These are usually mild and go away on their own, but serious reactions are also possible. Most people who get a flu shot do not have any problems with it.      Minor problems following a flu shot include:    soreness, redness, or swelling where the shot was given     hoarseness   sore, red or itchy eyes   cough   fever   aches   headache   itching   fatigue  If these problems occur, they usually begin soon after the shot and last 1 or 2 days. More serious problems following a flu shot can include the following:     There may be a small increased risk of Guillain-Barré Syndrome (GBS) after inactivated flu vaccine. This risk has been estimated at 1 or 2 additional cases per million people vaccinated. This is much lower than the risk of severe complications from flu, which can be prevented by flu vaccine.  Young children who get the flu shot along with pneumococcal vaccine (PCV13) and/or DTaP vaccine at the same time might be slightly more likely to have a seizure caused by fever. Ask your doctor for more information. Tell your doctor if a child who is getting flu vaccine has ever had a seizure. Problems that could happen after any injected vaccine:      People sometimes faint after a medical procedure, including vaccination. Sitting or lying down for about 15 minutes can help prevent fainting, and injuries caused by a fall. Tell your doctor if you feel dizzy, or have vision changes or ringing in the ears.  Some people get severe pain in the shoulder and have difficulty moving the arm where a shot was given. This happens very rarely.  Any medication can cause a severe allergic reaction. Such reactions from a vaccine are very rare, estimated at about 1 in a million doses, and would happen within a few minutes to a few hours after the vaccination. As with any medicine, there is a very remote chance of a vaccine causing a serious injury or death. The safety of vaccines is always being monitored. For more information, visit: www.cdc.gov/vaccinesafety/    5. What if there is a serious reaction? What should I look for?  Look for anything that concerns you, such as signs of a severe allergic reaction, very high fever, or unusual behavior.     Signs of a severe allergic reaction can include hives, swelling of the face and throat, difficulty breathing, a fast heartbeat, dizziness, and weakness - usually within a few minutes to a few hours after the vaccination. What should I do?  If you think it is a severe allergic reaction or other emergency that cant wait, call 9-1-1 and get the person to the nearest hospital. Otherwise, call your doctor.  Reactions should be reported to the Vaccine Adverse Event Reporting System (VAERS). Your doctor should file this report, or you can do it yourself through  the VAERS web site at www.vaers. [facility].gov, or by calling 4-799.473.6915. VAERS does not give medical advice. 6. The National Vaccine Injury Compensation Program    The [facility] Vaccine Injury Compensation Program (VICP) is a federal program that was created to compensate people who may have been injured by certain vaccines. Persons who believe they may have been injured by a vaccine can learn about the program and about filing a claim by calling 2-700.253.3889 or visiting the [employer] website at www.[facility].gov/vaccinecompensation. There is a time limit to file a claim for compensation. 7. How can I learn more?  Ask your healthcare provider. He or she can give you the vaccine package insert or suggest other sources of information.  Call your local or state health department.  Contact the Centers for Disease Control and Prevention (CDC):  - Call 1-484.750.2825 (1-800-CDC-INFO) or  - Visit CDCs website at www.cdc.gov/flu    Vaccine Information Statement   Inactivated Influenza Vaccine   8/7/2015  42 ANDRESMarlon Lynch Karynrogelio [id number]    Department of Health and Human Services  Centers for Disease Control and Prevention    Office Use Only

## 2017-10-10 NOTE — PROGRESS NOTES
Kartik Bosch is a 61 y.o.  female presents today for office visit for follow up. Pt is in Room # 4      1. Have you been to the ER, urgent care clinic since your last visit? Hospitalized since your last visit? No    2. Have you seen or consulted any other health care providers outside of the [address] [place] [address] since your last visit? Include any pap smears or colon screening. neurosurgeon    Health Maintenance reviewed - patient recieved flu vx today. Tdap and pneu vx patient still has prescription. Faxed over authorization disclosure for eye exam.         Kartik Bosch is a 61 y.o. female who presents for routine immunizations. She denies any symptoms , reactions or allergies that would exclude them from being immunized today. Risks and adverse reactions were discussed and the VIS was given to them. All questions were addressed. She was observed for10 min post injection. There were no reactions observed.     Lillie Prieto LPN     VORB: Administer Flulaval via IM injection once./Virgil Diaz MD/ Lillie Prieto LPN

## 2017-10-10 NOTE — PROGRESS NOTES
SUBJECTIVE:  Letha Fall is a 61y.o. year old female   Chief Complaint   Patient presents with    Hypertension    Cholesterol Problem    Diabetes       History of Present Illness:     She missed her BP medications several days last week. She has been taking her medications for the last several days. She says at home it always runs over 535 systolic. She denies any new neurologic, respiratory or hemodynamic symptoms. She is seeing neurology and physiatry for MS and pain. Her glucose is stiil running good when she checks it. No Systemic, Cardiovascular or Respiratory symptoms. Past Medical History:   Diagnosis Date    Cephalgia     Hyperlipemia     mild    Hypertension     IFG (impaired fasting glucose)     MS (multiple sclerosis) (HCC)     Optic neuritis     Tendonitis of shoulder      Past Surgical History:   Procedure Laterality Date    HX HYSTERECTOMY      fibroids and endometriosis        Current Outpatient Prescriptions   Medication Sig    metoprolol succinate (TOPROL-XL) 100 mg tablet TAKE 1 TABLET BY MOUTH DAILY    lisinopril-hydrochlorothiazide (PRINZIDE, ZESTORETIC) 20-12.5 mg per tablet TAKE 1 TABLET BY MOUTH DAILY    Blood-Glucose Meter monitoring kit Re: DM .00. Check once daily.  glucose blood VI test strips (BLOOD GLUCOSE TEST) strip Re: DM .00. Check once daily.  Lancets misc Re: DM .00. Check once daily.  dimethyl fumarate (TECFIDERA) 240 mg cpDR Take  by mouth two (2) times a day.  aspirin delayed-release 81 mg tablet Take  by mouth daily.  Cholecalciferol, Vitamin D3, (VITAMIN D3) 1,000 unit cap Take 1,000 Units by mouth daily. No current facility-administered medications for this visit.         No Known Allergies     Family History   Problem Relation Age of Onset    Cancer Mother      breast    Diabetes Father     Hypertension Father     Heart Disease Father     Diabetes Sister     Diabetes Brother     Hypertension Sister         Social History   Substance Use Topics    Smoking status: Never Smoker    Smokeless tobacco: Never Used    Alcohol use No       Review of Systems:   Constitutional: No fever, chills, night sweats, malaise, dizziness. Cardiovascular: No angina, palpitations, PND, orthopnea, lightheadedness, edema, claudication. Respiratory: No dyspnea, wheeze, pleurisy, hemoptysis, unusual cough or sputum. Gastrointestinal: No nausea/ vomiting, bowel habit change, pain, MIRELLA symptoms, melena, hematochezia, anorexia. Neurological: No seizures, speech abnormality, incontinence. Psychiatric: No agitation, confusion/disorientation, suicidal or homicidal ideation. Musculoskeletal[de-identified] denies any other complaints. Endocrine: Denies any complaints. OBJECTIVE:  Physical Exam:   Constitutional: General Appearance:  well developed, execissively obese, nontoxic, in no acute distress. Visit Vitals    /90 (BP 1 Location: Left arm, BP Patient Position: Sitting)    Pulse 68    Temp 98.4 °F (36.9 °C) (Oral)    Resp 16    Ht 5' 8\" (1.727 m)    Wt 277 lb (125.6 kg)    SpO2 97%    BMI 42.12 kg/m2     Pulmonary: Respiratory effort: normal; no dyspnea, no retractions, no accessory muscle use. Auscultation: normal & symmetrical air exchange; no rales, no rhonchi, no wheeze; no rubs    Cardiovascular: Palpation: PMI not displaced or enlarged, no thrills or heaves. Auscultation: RRR; no murmur, rubs or gallops. Extremities: no edema, no active varicosity. N nithya: carotid arteries- normal volume & without bruit; no JVD. Gastrointestinal: Normal bowel sounds. No masses; no tenderness; no rebound/rigidity; no CVA tenderness. No hepatosplenomegaly. Psychiatric: Oriented to time, place and person. Normal mood, no agitation or anxiety. Normal affect. Pleasant and cooperative. Neurologic: CN I to XII: intact. DTR: symmetrical & normal.  No focal weakness. Musculoskeletal: NC/AT. Neck-supple. Diabetic foot exam:     Left: Reflexes 2+     Vibratory sensation normal    Proprioception normal   Sharp/dull discrimination normal    Filament test normal sensation with micro filament   Pulse DP: 2+ (normal)   Pulse PT: 2+ (normal)   Deformities: Mild - small bunion  Right: Reflexes 2+   Vibratory sensation normal   Proprioception normal   Sharp/dull discrimination normal   Filament test normal sensation with micro filament   Pulse DP: 2+ (normal)   Pulse PT: 2+ (normal)   Deformities: Mild - small bunion      Lab Results   Component Value Date/Time    TSH 1.61 02/10/2017 09:46 AM     Lab Results   Component Value Date/Time    Cholesterol, total 154 02/10/2017 09:46 AM    HDL Cholesterol 43 02/10/2017 09:46 AM    LDL, calculated 92.4 02/10/2017 09:46 AM    VLDL, calculated 18.6 02/10/2017 09:46 AM    Triglyceride 93 02/10/2017 09:46 AM    CHOL/HDL Ratio 3.6 02/10/2017 09:46 AM     Lab Results   Component Value Date/Time    Hemoglobin A1c 6.8 06/30/2017 09:35 AM    Hemoglobin A1c (POC) 6.8 02/25/2016 09:02 AM     Lab Results   Component Value Date/Time    Sodium 137 06/30/2017 09:35 AM    Potassium 4.6 06/30/2017 09:35 AM    Chloride 99 06/30/2017 09:35 AM    CO2 29 06/30/2017 09:35 AM    Anion gap 9 06/30/2017 09:35 AM    Glucose 130 06/30/2017 09:35 AM    BUN 20 06/30/2017 09:35 AM    Creatinine 0.74 06/30/2017 09:35 AM    BUN/Creatinine ratio 27 06/30/2017 09:35 AM    GFR est AA >60 06/30/2017 09:35 AM    GFR est non-AA >60 06/30/2017 09:35 AM    Calcium 9.0 06/30/2017 09:35 AM    Bilirubin, total 0.4 02/10/2017 09:46 AM    AST (SGOT) 15 02/10/2017 09:46 AM    Alk.  phosphatase 102 02/10/2017 09:46 AM    Protein, total 7.7 02/10/2017 09:46 AM    Albumin 3.3 02/10/2017 09:46 AM    Globulin 4.4 02/10/2017 09:46 AM    A-G Ratio 0.8 02/10/2017 09:46 AM    ALT (SGPT) 20 02/10/2017 09:46 AM     Lab Results   Component Value Date/Time    WBC 11.6 02/10/2017 09:46 AM    HGB 13.8 02/10/2017 09:46 AM HCT 42.8 02/10/2017 09:46 AM    PLATELET 253 25/15/2866 09:46 AM    MCV 87.0 02/10/2017 09:46 AM     EKG 4/14/17: NSR, q in inferior leads, ~WNL; no change since 10/19/2011. ASSESSMENT:     1. Essential hypertension, not in target    2. Type 2 diabetes mellitus without complication, without long-term current use of insulin ([facility]    3. Multiple sclerosis ([facility]    4. Encounter for immunization        PLAN:     Pharmacologic Management: Medications reviewed with the patient. Increase lisinopril to 40 mg. Take the medications regularly. She declines a Statin and antidiabetic medication. Orders Placed This Encounter    Influenza virus vaccine (QUADRIVALENT PRES FREE SYRINGE) IM (56032)    URINALYSIS W/ RFLX MICROSCOPIC    MICROALBUMIN, UR, RAND W/ MICROALBUMIN/CREA RATIO    AMB POC HEMOGLOBIN A1C    HM DIABETES FOOT EXAM    metoprolol succinate (TOPROL-XL) 100 mg tablet    lisinopril (PRINIVIL, ZESTRIL) 40 mg tablet    hydroCHLOROthiazide (HYDRODIURIL) 12.5 mg tablet     Discussed DDx, follow-up & work-up. Discussed risk/benefit & side effect of treatment. Follow up visit as planned, prn sooner. Declines referral to hand surgeon. Low salt ADA diet, weightloss & graduated excecise. Health risk from non adherence discussed again. Patient voiced understanding. Follow-up Disposition:  Return in about 1 month (around 11/10/2017).     Cornel Lux MD

## 2017-10-11 ENCOUNTER — TELEPHONE (OUTPATIENT)
Dept: FAMILY MEDICINE CLINIC | Facility: CLINIC | Age: 57
End: 2017-10-11

## 2017-10-11 NOTE — TELEPHONE ENCOUNTER
Called pt and left message. Call back number left. The call was to inform pt of the lab results, Urine tests were normal.    Letter sent.

## 2017-10-11 NOTE — LETTER
10/11/2017 8:04 AM 
 
Ms. Kourtney Solis [address] [facility] 101 [address] [facility] [address] Dear Ms. Zavala Jim, We have been unable to reach you by phone to notify you of your test results. Please call our office at [phone] and ask to speak with my nurse in order to explain these results to you and advise you of any recommendations.  
 
 
 
Sincerely, 
 
 
Christine Morris MD

## 2018-05-03 ENCOUNTER — HOSPITAL ENCOUNTER (OUTPATIENT)
Dept: LAB | Age: 58
Discharge: HOME | End: 2018-05-03
Payer: COMMERCIAL

## 2018-05-03 ENCOUNTER — OFFICE VISIT (OUTPATIENT)
Dept: FAMILY MEDICINE CLINIC | Age: 58
End: 2018-05-03

## 2018-05-03 VITALS
HEIGHT: 68 IN | SYSTOLIC BLOOD PRESSURE: 180 MMHG | BODY MASS INDEX: 40.92 KG/M2 | RESPIRATION RATE: 16 BRPM | WEIGHT: 270 LBS | OXYGEN SATURATION: 99 % | DIASTOLIC BLOOD PRESSURE: 100 MMHG | TEMPERATURE: 97.5 F | HEART RATE: 92 BPM

## 2018-05-03 DIAGNOSIS — E78.5 HYPERLIPIDEMIA, UNSPECIFIED HYPERLIPIDEMIA TYPE: ICD-10-CM

## 2018-05-03 DIAGNOSIS — E11.9 TYPE 2 DIABETES MELLITUS WITHOUT COMPLICATION, WITHOUT LONG-TERM CURRENT USE OF INSULIN (HCC): ICD-10-CM

## 2018-05-03 DIAGNOSIS — G35 MULTIPLE SCLEROSIS (HCC): ICD-10-CM

## 2018-05-03 DIAGNOSIS — E66.01 OBESITY, MORBID (HCC): ICD-10-CM

## 2018-05-03 DIAGNOSIS — I10 ESSENTIAL HYPERTENSION: ICD-10-CM

## 2018-05-03 DIAGNOSIS — E55.9 HYPOVITAMINOSIS D: ICD-10-CM

## 2018-05-03 DIAGNOSIS — E11.9 TYPE 2 DIABETES MELLITUS WITHOUT COMPLICATION, WITHOUT LONG-TERM CURRENT USE OF INSULIN (HCC): Primary | ICD-10-CM

## 2018-05-03 DIAGNOSIS — Z23 ENCOUNTER FOR IMMUNIZATION: ICD-10-CM

## 2018-05-03 LAB
25(OH)D3 SERPL-MCNC: 14.9 NG/ML (ref 30–100)
ALBUMIN SERPL-MCNC: 3.3 G/DL (ref 3.4–5)
ALBUMIN/GLOB SERPL: 0.8 {RATIO} (ref 0.8–1.7)
ALP SERPL-CCNC: 104 U/L (ref 45–117)
ALT SERPL-CCNC: 29 U/L (ref 13–56)
ANION GAP SERPL CALC-SCNC: 11 MMOL/L (ref 3–18)
APPEARANCE UR: CLEAR
AST SERPL-CCNC: 14 U/L (ref 15–37)
BASOPHILS # BLD: 0.1 K/UL (ref 0–0.06)
BASOPHILS NFR BLD: 0 % (ref 0–2)
BILIRUB SERPL-MCNC: 0.4 MG/DL (ref 0.2–1)
BILIRUB UR QL: NEGATIVE
BUN SERPL-MCNC: 17 MG/DL (ref 7–18)
BUN/CREAT SERPL: 27 (ref 12–20)
CALCIUM SERPL-MCNC: 8.7 MG/DL (ref 8.5–10.1)
CHLORIDE SERPL-SCNC: 100 MMOL/L (ref 100–108)
CHOLEST SERPL-MCNC: 145 MG/DL
CO2 SERPL-SCNC: 28 MMOL/L (ref 21–32)
COLOR UR: YELLOW
CREAT SERPL-MCNC: 0.64 MG/DL (ref 0.6–1.3)
DIFFERENTIAL METHOD BLD: ABNORMAL
EOSINOPHIL # BLD: 0.3 K/UL (ref 0–0.4)
EOSINOPHIL NFR BLD: 2 % (ref 0–5)
ERYTHROCYTE [DISTWIDTH] IN BLOOD BY AUTOMATED COUNT: 14.5 % (ref 11.6–14.5)
EST. AVERAGE GLUCOSE BLD GHB EST-MCNC: 163 MG/DL
GLOBULIN SER CALC-MCNC: 4.4 G/DL (ref 2–4)
GLUCOSE SERPL-MCNC: 141 MG/DL (ref 74–99)
GLUCOSE UR STRIP.AUTO-MCNC: NEGATIVE MG/DL
HBA1C MFR BLD: 7.3 % (ref 4.2–5.6)
HCT VFR BLD AUTO: 41.5 % (ref 35–45)
HDLC SERPL-MCNC: 51 MG/DL (ref 40–60)
HDLC SERPL: 2.8 {RATIO} (ref 0–5)
HGB BLD-MCNC: 13.3 G/DL (ref 12–16)
HGB UR QL STRIP: NEGATIVE
KETONES UR QL STRIP.AUTO: NEGATIVE MG/DL
LDLC SERPL CALC-MCNC: 76.2 MG/DL (ref 0–100)
LEUKOCYTE ESTERASE UR QL STRIP.AUTO: NEGATIVE
LIPID PROFILE,FLP: NORMAL
LYMPHOCYTES # BLD: 2.6 K/UL (ref 0.9–3.6)
LYMPHOCYTES NFR BLD: 17 % (ref 21–52)
MCH RBC QN AUTO: 27.8 PG (ref 24–34)
MCHC RBC AUTO-ENTMCNC: 32 G/DL (ref 31–37)
MCV RBC AUTO: 86.8 FL (ref 74–97)
MONOCYTES # BLD: 1 K/UL (ref 0.05–1.2)
MONOCYTES NFR BLD: 7 % (ref 3–10)
NEUTS SEG # BLD: 11.2 K/UL (ref 1.8–8)
NEUTS SEG NFR BLD: 74 % (ref 40–73)
NITRITE UR QL STRIP.AUTO: NEGATIVE
PH UR STRIP: 6 [PH] (ref 5–8)
PLATELET # BLD AUTO: 356 K/UL (ref 135–420)
PMV BLD AUTO: 10.2 FL (ref 9.2–11.8)
POTASSIUM SERPL-SCNC: 4.2 MMOL/L (ref 3.5–5.5)
PROT SERPL-MCNC: 7.7 G/DL (ref 6.4–8.2)
PROT UR STRIP-MCNC: NEGATIVE MG/DL
RBC # BLD AUTO: 4.78 M/UL (ref 4.2–5.3)
SODIUM SERPL-SCNC: 139 MMOL/L (ref 136–145)
SP GR UR REFRACTOMETRY: 1.03 (ref 1–1.03)
TRIGL SERPL-MCNC: 89 MG/DL (ref ?–150)
TSH SERPL DL<=0.05 MIU/L-ACNC: 2.18 UIU/ML (ref 0.36–3.74)
UROBILINOGEN UR QL STRIP.AUTO: 1 EU/DL (ref 0.2–1)
VLDLC SERPL CALC-MCNC: 17.8 MG/DL
WBC # BLD AUTO: 15.1 K/UL (ref 4.6–13.2)

## 2018-05-03 PROCEDURE — 85025 COMPLETE CBC W/AUTO DIFF WBC: CPT | Performed by: FAMILY MEDICINE

## 2018-05-03 PROCEDURE — 36415 COLL VENOUS BLD VENIPUNCTURE: CPT | Performed by: FAMILY MEDICINE

## 2018-05-03 PROCEDURE — 82306 VITAMIN D 25 HYDROXY: CPT | Performed by: FAMILY MEDICINE

## 2018-05-03 PROCEDURE — 80053 COMPREHEN METABOLIC PANEL: CPT | Performed by: FAMILY MEDICINE

## 2018-05-03 PROCEDURE — 80061 LIPID PANEL: CPT | Performed by: FAMILY MEDICINE

## 2018-05-03 PROCEDURE — 81003 URINALYSIS AUTO W/O SCOPE: CPT | Performed by: FAMILY MEDICINE

## 2018-05-03 PROCEDURE — 84443 ASSAY THYROID STIM HORMONE: CPT | Performed by: FAMILY MEDICINE

## 2018-05-03 PROCEDURE — 83036 HEMOGLOBIN GLYCOSYLATED A1C: CPT | Performed by: FAMILY MEDICINE

## 2018-05-03 RX ORDER — METOPROLOL SUCCINATE 100 MG/1
TABLET, EXTENDED RELEASE ORAL
Qty: 90 TAB | Refills: 1 | Status: SHIPPED | OUTPATIENT
Start: 2018-05-03 | End: 2018-11-14 | Stop reason: SDUPTHER

## 2018-05-03 RX ORDER — LISINOPRIL 40 MG/1
40 TABLET ORAL DAILY
Qty: 90 TAB | Refills: 1 | Status: SHIPPED | OUTPATIENT
Start: 2018-05-03 | End: 2018-11-14 | Stop reason: SDUPTHER

## 2018-05-03 RX ORDER — HYDROCHLOROTHIAZIDE 12.5 MG/1
12.5 TABLET ORAL DAILY
Qty: 90 TAB | Refills: 1 | Status: SHIPPED | OUTPATIENT
Start: 2018-05-03 | End: 2018-11-14 | Stop reason: SDUPTHER

## 2018-05-03 NOTE — MR AVS SNAPSHOT
[address] [facility] 
 
 
 120 [facility] [address] [place] [address] 
[phone] Patient: Neelam Osborn MRN: [id number] :[id number] Visit Information Date & Time Provider Department Dept. Phone Encounter #  
 5/3/2018  7:30 AM Judge Kaden MD [facility] [phone] [id number] Follow-up Instructions Return in about 1 month (around 6/3/2018). Your Appointments 2018  7:30 AM  
Follow Up with Judge Kaden MD  
[facility] ([facility]) Appt Note: 1 month f/u  
 120 [facility] [address] [place] [address]  
[phone]  
  
   
 [facility] [address] [place], [place] [id number] Upcoming Health Maintenance Date Due Pneumococcal 19-64 Medium Risk (1 of 1 - PPSV23) 1978 DTaP/Tdap/Td series (1 - Tdap) 1980 LIPID PANEL Q1 2/10/2018 HEMOGLOBIN A1C Q6M 4/10/2018 Influenza Age 5 to Adult 2018 EYE EXAM RETINAL OR DILATED Q1 2018 BREAST CANCER SCRN MAMMOGRAM 2018 FOOT EXAM Q1 10/10/2018 MICROALBUMIN Q1 10/10/2018 PAP AKA CERVICAL CYTOLOGY 2/10/2020 COLONOSCOPY 2022 Allergies as of 5/3/2018  Review Complete On: 5/3/2018 By: Judge Kaden MD  
 No Known Allergies Current Immunizations  Reviewed on 10/10/2017 Name Date Influenza Vaccine (Quad) PF 10/10/2017  8:34 AM  
 Pneumococcal Polysaccharide (PPSV-23)  Incomplete Not reviewed this visit You Were Diagnosed With   
  
 Codes Comments Type 2 diabetes mellitus without complication, without long-term current use of insulin (HCC)    -  Primary ICD-10-CM: E11.9 ICD-9-CM: 250.00 Essential hypertension     ICD-10-CM: I10 
ICD-9-CM: 401.9 Hyperlipidemia, unspecified hyperlipidemia type     ICD-10-CM: E78.5 ICD-9-CM: 272.4 Multiple sclerosis (RUST 75.)     ICD-10-CM: G35 
ICD-9-CM: 174 Obesity, morbid (RUST 75.)     ICD-10-CM: E66.01 
ICD-9-CM: 278.01 Hypovitaminosis D     ICD-10-CM: E55.9 ICD-9-CM: 268.9 Encounter for immunization     ICD-10-CM: B94 ICD-9-CM: V03.89 Vitals BP Pulse Temp Resp Height(growth percentile) Weight(growth percentile) (!) 180/100 (BP 1 Location: Left arm, BP Patient Position: Sitting) 92 97.5 °F (36.4 °C) (Oral) 16 5' 8\" (1.727 m) 270 lb (122.5 kg) SpO2 BMI OB Status Smoking Status 99% 41.05 kg/m2 Hysterectomy Never Smoker Vitals History BMI and BSA Data Body Mass Index Body Surface Area 41.05 kg/m 2 2.42 m 2 Preferred Pharmacy Pharmacy Name Phone [address] [place] [address] [phone] Your Updated Medication List  
  
   
This list is accurate as of 5/3/18  8:38 AM.  Always use your most recent med list.  
  
  
  
  
 aspirin delayed-release 81 mg tablet Take  by mouth daily. Blood-Glucose Meter monitoring kit Re: DM .00. Check once daily. diph,Pertuss(Acell),Tet Vac-PF 2 Lf-(2.5-5-3-5 mcg)-5Lf/0.5 mL susp Commonly known as:  ADACEL  
0.5 mL by IntraMUSCular route once for 1 dose. glucose blood VI test strips strip Commonly known as:  blood glucose test  
Re: DM .00. Check once daily. hydroCHLOROthiazide 12.5 mg tablet Commonly known as:  HYDRODIURIL Take 1 Tab by mouth daily. Lancets Misc Re: DM .00. Check once daily. lisinopril 40 mg tablet Commonly known as:  [place]ila Handing Take 1 Tab by mouth daily. metoprolol succinate 100 mg tablet Commonly known as:  TOPROL-XL  
TAKE 1 TABLET BY MOUTH DAILY TECFIDERA 240 mg Cpdr  
Generic drug:  dimethyl fumarate Take  by mouth two (2) times a day. VITAMIN D3 1,000 unit Cap Generic drug:  cholecalciferol Take 1,000 Units by mouth daily. Prescriptions Printed Refills diph,Pertuss,Acell,,Tet Vac-PF (ADACEL) 2 Lf-(2.5-5-3-5 mcg)-5Lf/0.5 mL susp 0 Si.5 mL by IntraMUSCular route once for 1 dose. Class: Print Route: IntraMUSCular Prescriptions Sent to Pharmacy Refills  
 metoprolol succinate (TOPROL-XL) 100 mg tablet 1 Sig: TAKE 1 TABLET BY MOUTH DAILY Class: Normal  
 Pharmacy: [place] [address] Ph #: [phone]  
 lisinopril (PRINIVIL, ZESTRIL) 40 mg tablet 1 Sig: Take 1 Tab by mouth daily. Class: Normal  
 Pharmacy: [address] [place] [address] Ph #: [phone] Route: Oral  
 hydroCHLOROthiazide (HYDRODIURIL) 12.5 mg tablet 1 Sig: Take 1 Tab by mouth daily. Class: Normal  
 Pharmacy: [address] [place] [address] Ph #: [phone] Route: Oral  
  
We Performed the Following PNEUMOCOCCAL POLYSACCHARIDE VACCINE, 23-VALENT, ADULT OR IMMUNOSUPPRESSED PT DOSE, [03079 CPT(R)] AR IMMUNIZ ADMIN,1 SINGLE/COMB VAC/TOXOID [id number] CPT(R)] Follow-up Instructions Return in about 1 month (around 6/3/2018). Patient Instructions Body Mass Index: Care Instructions Your Care Instructions Body mass index (BMI) can help you see if your weight is raising your risk for health problems. It uses a formula to compare how much you weigh with how tall you are. · A BMI lower than 18.5 is considered underweight. · A BMI between 18.5 and 24.9 is considered healthy. · A BMI between 25 and 29.9 is considered overweight. A BMI of 30 or higher is considered obese. If your BMI is in the normal range, it means that you have a lower risk for weight-related health problems.  If your BMI is in the overweight or obese range, you may be at increased risk for weight-related health problems, such as high blood pressure, heart disease, stroke, arthritis or joint pain, and diabetes. If your BMI is in the underweight range, you may be at increased risk for health problems such as fatigue, lower protection (immunity) against illness, muscle loss, bone loss, hair loss, and hormone problems. BMI is just one measure of your risk for weight-related health problems. You may be at higher risk for health problems if you are not active, you eat an unhealthy diet, or you drink too much alcohol or use tobacco products. Follow-up care is a key part of your treatment and safety. Be sure to make and go to all appointments, and call your doctor if you are having problems. It's also a good idea to know your test results and keep a list of the medicines you take. How can you care for yourself at home? · Practice healthy eating habits. This includes eating plenty of fruits, vegetables, whole grains, lean protein, and low-fat dairy. · If your doctor recommends it, get more exercise. Walking is a good choice. Bit by bit, increase the amount you walk every day. Try for at least 30 minutes on most days of the week. · Do not smoke. Smoking can increase your risk for health problems. If you need help quitting, talk to your doctor about stop-smoking programs and medicines. These can increase your chances of quitting for good. · Limit alcohol to 2 drinks a day for men and 1 drink a day for women. Too much alcohol can cause health problems. If you have a BMI higher than 25 · Your doctor may do other tests to check your risk for weight-related health problems. This may include measuring the distance around your waist. A waist measurement of more than 40 inches in men or 35 inches in women can increase the risk of weight-related health problems. · Talk with your doctor about steps you can take to stay healthy or improve your health. You may need to make lifestyle changes to lose weight and stay healthy, such as changing your diet and getting regular exercise. If you have a BMI lower than 18.5 · Your doctor may do other tests to check your risk for health problems. · Talk with your doctor about steps you can take to stay healthy or improve your health. You may need to make lifestyle changes to gain or maintain weight and stay healthy, such as getting more healthy foods in your diet and doing exercises to build muscle. Where can you learn more? Go to http://shaka-bimal.info/. Enter S176 in the search box to learn more about \"Body Mass Index: Care Instructions. \" Current as of: October 13, 2016 Content Version: 11.4 © 9069-8494 [employer]. Care instructions adapted under license by [employer] (which disclaims liability or warranty for this information). If you have questions about a medical condition or this instruction, always ask your healthcare professional. [address] any warranty or liability for your use of this information. Starting a Weight Loss Plan: Care Instructions Your Care Instructions If you are thinking about losing weight, it can be hard to know where to start. Your doctor can help you set up a weight loss plan that best meets your needs. You may want to take a class on nutrition or exercise, or join a weight loss support group. If you have questions about how to make changes to your eating or exercise habits, ask your doctor about seeing a registered dietitian or an exercise specialist. 
It can be a big challenge to lose weight. But you do not have to make huge changes at once. Make small changes, and stick with them. When those changes become habit, add a few more changes. If you do not think you are ready to make changes right now, try to pick a date in the future. Make an appointment to see your doctor to discuss whether the time is right for you to start a plan. Follow-up care is a key part of your treatment and safety.  Be sure to make and go to all appointments, and call your doctor if you are having problems. It's also a good idea to know your test results and keep a list of the medicines you take. How can you care for yourself at home? · Set realistic goals. Many people expect to lose much more weight than is likely. A weight loss of 5% to 10% of your body weight may be enough to improve your health. · Get family and friends involved to provide support. Talk to them about why you are trying to lose weight, and ask them to help. They can help by participating in exercise and having meals with you, even if they may be eating something different. · Find what works best for you. If you do not have time or do not like to cook, a program that offers meal replacement bars or shakes may be better for you. Or if you like to prepare meals, finding a plan that includes daily menus and recipes may be best. 
· Ask your doctor about other health professionals who can help you achieve your weight loss goals. ¨ A dietitian can help you make healthy changes in your diet. ¨ An exercise specialist or  can help you develop a safe and effective exercise program. 
¨ A counselor or psychiatrist can help you cope with issues such as depression, anxiety, or family problems that can make it hard to focus on weight loss. · Consider joining a support group for people who are trying to lose weight. Your doctor can suggest groups in your area. Where can you learn more? Go to http://shaka-bimal.info/. Enter Y729 in the search box to learn more about \"Starting a Weight Loss Plan: Care Instructions. \" Current as of: October 13, 2016 Content Version: 11.4 © 7421-3840 [employer]. Care instructions adapted under license by [employer] (which disclaims liability or warranty for this information).  If you have questions about a medical condition or this instruction, always ask your healthcare professional. [address] any warranty or liability for your use of this information. Introducing [facility]! Dear Chip Teague: Thank you for requesting a [employer] account. Our records indicate that you already have an active iPixC[employer] account. You can access your account anytime at [email or web address] Did you know that you can access your hospital and ER discharge instructions at any time in iPix[employer]el? You can also review all of your test results from your hospital stay or ER visit. Additional Information If you have questions, please visit the Frequently Asked Questions section of the [employer] website at [email or web address] Remember, iPixCel is NOT to be used for urgent needs. For medical emergencies, dial 911. Now available from your iPhone and Android! Please provide this summary of care documentation to your next provider. Your primary care clinician is listed as Feng Nelson. If you have any questions after today's visit, please call [phone].

## 2018-05-03 NOTE — PATIENT INSTRUCTIONS
Body Mass Index: Care Instructions  Your Care Instructions    Body mass index (BMI) can help you see if your weight is raising your risk for health problems. It uses a formula to compare how much you weigh with how tall you are. · A BMI lower than 18.5 is considered underweight. · A BMI between 18.5 and 24.9 is considered healthy. · A BMI between 25 and 29.9 is considered overweight. A BMI of 30 or higher is considered obese. If your BMI is in the normal range, it means that you have a lower risk for weight-related health problems. If your BMI is in the overweight or obese range, you may be at increased risk for weight-related health problems, such as high blood pressure, heart disease, stroke, arthritis or joint pain, and diabetes. If your BMI is in the underweight range, you may be at increased risk for health problems such as fatigue, lower protection (immunity) against illness, muscle loss, bone loss, hair loss, and hormone problems. BMI is just one measure of your risk for weight-related health problems. You may be at higher risk for health problems if you are not active, you eat an unhealthy diet, or you drink too much alcohol or use tobacco products. Follow-up care is a key part of your treatment and safety. Be sure to make and go to all appointments, and call your doctor if you are having problems. It's also a good idea to know your test results and keep a list of the medicines you take. How can you care for yourself at home? · Practice healthy eating habits. This includes eating plenty of fruits, vegetables, whole grains, lean protein, and low-fat dairy. · If your doctor recommends it, get more exercise. Walking is a good choice. Bit by bit, increase the amount you walk every day. Try for at least 30 minutes on most days of the week. · Do not smoke. Smoking can increase your risk for health problems. If you need help quitting, talk to your doctor about stop-smoking programs and medicines. These can increase your chances of quitting for good. · Limit alcohol to 2 drinks a day for men and 1 drink a day for women. Too much alcohol can cause health problems. If you have a BMI higher than 25  · Your doctor may do other tests to check your risk for weight-related health problems. This may include measuring the distance around your waist. A waist measurement of more than 40 inches in men or 35 inches in women can increase the risk of weight-related health problems. · Talk with your doctor about steps you can take to stay healthy or improve your health. You may need to make lifestyle changes to lose weight and stay healthy, such as changing your diet and getting regular exercise. If you have a BMI lower than 18.5  · Your doctor may do other tests to check your risk for health problems. · Talk with your doctor about steps you can take to stay healthy or improve your health. You may need to make lifestyle changes to gain or maintain weight and stay healthy, such as getting more healthy foods in your diet and doing exercises to build muscle. Where can you learn more? Go to http://shaka-bimal.info/. Enter S176 in the search box to learn more about \"Body Mass Index: Care Instructions. \"  Current as of: October 13, 2016  Content Version: 11.4  © 3627-8893 Healthwise, Incorporated. Care instructions adapted under license by [employer] (which disclaims liability or warranty for this information). If you have questions about a medical condition or this instruction, always ask your healthcare professional. [place] [address] any warranty or liability for your use of this information. Starting a Weight Loss Plan: Care Instructions  Your Care Instructions    If you are thinking about losing weight, it can be hard to know where to start. Your doctor can help you set up a weight loss plan that best meets your needs.  You may want to take a class on nutrition or exercise, or join a weight loss support group. If you have questions about how to make changes to your eating or exercise habits, ask your doctor about seeing a registered dietitian or an exercise specialist.  It can be a big challenge to lose weight. But you do not have to make huge changes at once. Make small changes, and stick with them. When those changes become habit, add a few more changes. If you do not think you are ready to make changes right now, try to pick a date in the future. Make an appointment to see your doctor to discuss whether the time is right for you to start a plan. Follow-up care is a key part of your treatment and safety. Be sure to make and go to all appointments, and call your doctor if you are having problems. It's also a good idea to know your test results and keep a list of the medicines you take. How can you care for yourself at home? · Set realistic goals. Many people expect to lose much more weight than is likely. A weight loss of 5% to 10% of your body weight may be enough to improve your health. · Get family and friends involved to provide support. Talk to them about why you are trying to lose weight, and ask them to help. They can help by participating in exercise and having meals with you, even if they may be eating something different. · Find what works best for you. If you do not have time or do not like to cook, a program that offers meal replacement bars or shakes may be better for you. Or if you like to prepare meals, finding a plan that includes daily menus and recipes may be best.  · Ask your doctor about other health professionals who can help you achieve your weight loss goals. ¨ A dietitian can help you make healthy changes in your diet.   ¨ An exercise specialist or  can help you develop a safe and effective exercise program.  ¨ A counselor or psychiatrist can help you cope with issues such as depression, anxiety, or family problems that can make it hard to focus on weight loss. · Consider joining a support group for people who are trying to lose weight. Your doctor can suggest groups in your area. Where can you learn more? Go to http://shaka-bimal.info/. Enter X787 in the search box to learn more about \"Starting a Weight Loss Plan: Care Instructions. \"  Current as of: October 13, 2016  Content Version: 11.4  © 8133-5843 [employer]. Care instructions adapted under license by [employer] (which disclaims liability or warranty for this information). If you have questions about a medical condition or this instruction, always ask your healthcare professional. [address] any warranty or liability for your use of this information.

## 2018-05-03 NOTE — PROGRESS NOTES
Colby Manzano is a 61 y.o. female presents to office for htn, cholesterol, dm        Health Maintenance items with a due date reviewed with patient:  Health Maintenance Due   Topic Date Due    Pneumococcal 19-64 Medium Risk (1 of 1 - PPSV23) 05/19/1978    DTaP/Tdap/Td series (1 - Tdap) 05/19/1980    LIPID PANEL Q1  02/10/2018    HEMOGLOBIN A1C Q6M  04/10/2018

## 2018-05-03 NOTE — PROGRESS NOTES
SUBJECTIVE:  Mane Singh is a 61y.o. year old female   Chief Complaint   Patient presents with    Hypertension    Diabetes    Cholesterol Problem       History of Present Illness: The patient has been lost to follow-up again. When she was seen last time she had elevated blood pressure partially due to missing her medication. Her medications were adjusted and she was supposed to come back in a month. She returns after 7 months for follow-up she has missed her blood pressure medication for last few weeks. She has not been checking her blood sugar regularly. She says when she checks her blood sugar it is running \"good\". She says she has been following lifestyle modification for diabetes and hypertension and hyperlipidemia. She is seeing neurology and physiatry for MS and neck pain. Other than back pain for which she has seen various specialists and is going to follow-up, she denies any other complaints. She has gotten steroid injection in her back several times. No Systemic, Cardiovascular or Respiratory symptoms. Past Medical History:   Diagnosis Date    Cephalgia     Hyperlipemia     mild    Hypertension     IFG (impaired fasting glucose)     MS (multiple sclerosis) (HCC)     Optic neuritis     Tendonitis of shoulder      Past Surgical History:   Procedure Laterality Date    HX HYSTERECTOMY      fibroids and endometriosis        Current Outpatient Prescriptions   Medication Sig    metoprolol succinate (TOPROL-XL) 100 mg tablet TAKE 1 TABLET BY MOUTH DAILY    lisinopril (PRINIVIL, ZESTRIL) 40 mg tablet Take 1 Tab by mouth daily.  hydroCHLOROthiazide (HYDRODIURIL) 12.5 mg tablet Take 1 Tab by mouth daily.  Blood-Glucose Meter monitoring kit Re: DM .00. Check once daily.  glucose blood VI test strips (BLOOD GLUCOSE TEST) strip Re: DM .00. Check once daily.  Lancets misc Re: DM .00. Check once daily.     dimethyl fumarate (TECFIDERA) 240 mg cpDR Take  by mouth two (2) times a day.  aspirin delayed-release 81 mg tablet Take  by mouth daily.  Cholecalciferol, Vitamin D3, (VITAMIN D3) 1,000 unit cap Take 1,000 Units by mouth daily. No current facility-administered medications for this visit. She has been out of Toprol, Prinivil, hydrochlorothiazide for over 2 weeks. No Known Allergies     Family History   Problem Relation Age of Onset    Cancer Mother      breast    Diabetes Father     Hypertension Father     Heart Disease Father     Diabetes Sister     Diabetes Brother     Hypertension Sister         Social History   Substance Use Topics    Smoking status: Never Smoker    Smokeless tobacco: Never Used    Alcohol use No       Review of Systems:   Constitutional: No fever, chills, night sweats, malaise, dizziness. Cardiovascular: No angina, palpitations, PND, orthopnea, lightheadedness, edema, claudication. Respiratory: No dyspnea, wheeze, pleurisy, hemoptysis, unusual cough or sputum. Gastrointestinal: No nausea/ vomiting, bowel habit change, pain, MIRELLA symptoms, melena, hematochezia, anorexia. Neurological: No seizures, speech abnormality, incontinence. Psychiatric: No agitation, confusion/disorientation, suicidal or homicidal ideation. Musculoskeletal[de-identified] denies any other complaints. Endocrine: Denies any complaints. OBJECTIVE:  Physical Exam:   Constitutional: General Appearance:  well developed, execissively obese, nontoxic, in no acute distress. Visit Vitals    BP (!) 180/100 (BP 1 Location: Left arm, BP Patient Position: Sitting)    Pulse 92    Temp 97.5 °F (36.4 °C) (Oral)    Resp 16    Ht 5' 8\" (1.727 m)    Wt 270 lb (122.5 kg)    SpO2 99%    BMI 41.05 kg/m2     Pulmonary: Respiratory effort: normal; no dyspnea, no retractions, no accessory muscle use.    Auscultation: normal & symmetrical air exchange; no rales, no rhonchi, no wheeze; no rubs    Cardiovascular: Palpation: PMI not displaced or enlarged, no thrills or heaves. Auscultation: RRR; no murmur, rubs or gallops. Extremities: no edema, no active varicosity. N nithya: carotid arteries- normal volume & without bruit; no JVD. Gastrointestinal: Normal bowel sounds. No masses; no tenderness; no rebound/rigidity; no CVA tenderness. No hepatosplenomegaly. Psychiatric: Oriented to time, place and person. Normal mood, no agitation or anxiety. Normal affect. Pleasant and cooperative. Neurologic: CN I to XII: intact. DTR: symmetrical & normal.  No focal weakness. Musculoskeletal: NC/AT. Neck-supple. Lab Results   Component Value Date/Time    TSH 1.61 02/10/2017 09:46 AM     Lab Results   Component Value Date/Time    Cholesterol, total 154 02/10/2017 09:46 AM    HDL Cholesterol 43 02/10/2017 09:46 AM    LDL, calculated 92.4 02/10/2017 09:46 AM    VLDL, calculated 18.6 02/10/2017 09:46 AM    Triglyceride 93 02/10/2017 09:46 AM    CHOL/HDL Ratio 3.6 02/10/2017 09:46 AM     Lab Results   Component Value Date/Time    Hemoglobin A1c 6.8 (H) 06/30/2017 09:35 AM    Hemoglobin A1c (POC) 6.3 10/10/2017 09:01 AM     Lab Results   Component Value Date/Time    Sodium 137 06/30/2017 09:35 AM    Potassium 4.6 06/30/2017 09:35 AM    Chloride 99 (L) 06/30/2017 09:35 AM    CO2 29 06/30/2017 09:35 AM    Anion gap 9 06/30/2017 09:35 AM    Glucose 130 (H) 06/30/2017 09:35 AM    BUN 20 (H) 06/30/2017 09:35 AM    Creatinine 0.74 06/30/2017 09:35 AM    BUN/Creatinine ratio 27 (H) 06/30/2017 09:35 AM    GFR est AA >60 06/30/2017 09:35 AM    GFR est non-AA >60 06/30/2017 09:35 AM    Calcium 9.0 06/30/2017 09:35 AM    Bilirubin, total 0.4 02/10/2017 09:46 AM    AST (SGOT) 15 02/10/2017 09:46 AM    Alk.  phosphatase 102 02/10/2017 09:46 AM    Protein, total 7.7 02/10/2017 09:46 AM    Albumin 3.3 (L) 02/10/2017 09:46 AM    Globulin 4.4 (H) 02/10/2017 09:46 AM    A-G Ratio 0.8 02/10/2017 09:46 AM    ALT (SGPT) 20 02/10/2017 09:46 AM     Lab Results Component Value Date/Time    WBC 11.6 02/10/2017 09:46 AM    HGB 13.8 02/10/2017 09:46 AM    HCT 42.8 02/10/2017 09:46 AM    PLATELET 381 86/62/5230 09:46 AM    MCV 87.0 02/10/2017 09:46 AM         ASSESSMENT:     1. Type 2 diabetes mellitus without complication, without long-term current use of insulin ([facility]    2. Essential hypertension    3. Hyperlipidemia, unspecified hyperlipidemia type    4. Multiple sclerosis (HCC)    5. Obesity, morbid ([facility]    6. Hypovitaminosis D    7. Encounter for immunization        PLAN:     Discussed the patient's BMI with her. The BMI follow up plan is as follows:   -dietary management education, guidance, and counseling  -encourage exercise  -monitor weight prescribed dietary intake  -printed instructions in AVS.    Pharmacologic Management: Medications reviewed with the patient. Increase lisinopril to 40 mg. Take the medications regularly. She declines a Statin and antidiabetic medication. Orders Placed This Encounter    Pneumococcal polysaccharide vaccine, 23-valent, adult or immunosuppressed pt dose    VITAMIN D, 25 HYDROXY    CBC WITH AUTOMATED DIFF    METABOLIC PANEL, COMPREHENSIVE    LIPID PANEL    TSH 3RD GENERATION    HEMOGLOBIN A1C WITH EAG    URINALYSIS W/ RFLX MICROSCOPIC    RI IMMUNIZ ADMIN,1 SINGLE/COMB VAC/TOXOID    metoprolol succinate (TOPROL-XL) 100 mg tablet    lisinopril (PRINIVIL, ZESTRIL) 40 mg tablet    hydroCHLOROthiazide (HYDRODIURIL) 12.5 mg tablet    diph,Pertuss,Acell,,Tet Vac-PF (ADACEL) 2 Lf-(2.5-5-3-5 mcg)-5Lf/0.5 mL susp     Discussed DDx, follow-up & work-up. Discussed risk/benefit & side effect of treatment. Follow up visit as planned, prn sooner. Low salt ADA diet, weightloss & graduated excecise. Health risk from non adherence discussed again. Patient voiced understanding. Follow-up Disposition:  Return in about 1 month (around 6/3/2018).     Mely Gilliland MD

## 2018-05-07 ENCOUNTER — OFFICE VISIT (OUTPATIENT)
Dept: FAMILY MEDICINE CLINIC | Age: 58
End: 2018-05-07

## 2018-05-07 VITALS
TEMPERATURE: 98.5 F | RESPIRATION RATE: 16 BRPM | OXYGEN SATURATION: 98 % | HEART RATE: 77 BPM | HEIGHT: 68 IN | SYSTOLIC BLOOD PRESSURE: 126 MMHG | DIASTOLIC BLOOD PRESSURE: 80 MMHG | BODY MASS INDEX: 41.22 KG/M2 | WEIGHT: 272 LBS

## 2018-05-07 DIAGNOSIS — R05.9 COUGH: ICD-10-CM

## 2018-05-07 DIAGNOSIS — J01.90 ACUTE SINUSITIS, RECURRENCE NOT SPECIFIED, UNSPECIFIED LOCATION: Primary | ICD-10-CM

## 2018-05-07 DIAGNOSIS — J98.01 BRONCHOSPASM: ICD-10-CM

## 2018-05-07 RX ORDER — ALBUTEROL SULFATE 90 UG/1
2 AEROSOL, METERED RESPIRATORY (INHALATION)
Qty: 1 INHALER | Refills: 0 | Status: SHIPPED | OUTPATIENT
Start: 2018-05-07 | End: 2019-07-18

## 2018-05-07 RX ORDER — BENZONATATE 200 MG/1
200 CAPSULE ORAL
Qty: 21 CAP | Refills: 0 | Status: SHIPPED | OUTPATIENT
Start: 2018-05-07 | End: 2018-06-14

## 2018-05-07 RX ORDER — AMOXICILLIN AND CLAVULANATE POTASSIUM 875; 125 MG/1; MG/1
1 TABLET, FILM COATED ORAL 2 TIMES DAILY
Qty: 20 TAB | Refills: 0 | Status: SHIPPED | OUTPATIENT
Start: 2018-05-07 | End: 2018-05-17

## 2018-05-07 NOTE — PROGRESS NOTES
Hemalatha Pichardo is a 61 y.o. female presents to office for congestion/cough        Health Maintenance items with a due date reviewed with patient:  Health Maintenance Due   Topic Date Due    DTaP/Tdap/Td series (1 - Tdap) 05/19/1980

## 2018-05-07 NOTE — PROGRESS NOTES
SUBJECTIVE:  Lu Armenta is a 61y.o. year old female   Chief Complaint   Patient presents with    Nasal Congestion    Cough       History of Present Illness: The patient has been moderate nasal congestion with colored mucous and cough since 4 days ago. She has been having mild URI symptoms for few days prior to that. She also has been having mild wheezing at night, which she has done in the past with an URI. This morning she feels the mucus from a sinus is more purulent. She has mild malaise but no chills fever, nausea or vomiting, chest pain. She denies any significant dyspnea. She has not been taking Tecfidera for last several months. Her white count was elevated when checked 5 days ago. But she has been taking steroid injection, last one being within a week of the blood draw. She says she has been following lifestyle modification for diabetes and hypertension and hyperlipidemia. No other systemic, Cardiovascular or Respiratory symptoms. Past Medical History:   Diagnosis Date    Cephalgia     Hyperlipemia     mild    Hypertension     IFG (impaired fasting glucose)     MS (multiple sclerosis) (HCC)     Optic neuritis     Tendonitis of shoulder      Past Surgical History:   Procedure Laterality Date    HX HYSTERECTOMY      fibroids and endometriosis        Current Outpatient Prescriptions   Medication Sig    metoprolol succinate (TOPROL-XL) 100 mg tablet TAKE 1 TABLET BY MOUTH DAILY    lisinopril (PRINIVIL, ZESTRIL) 40 mg tablet Take 1 Tab by mouth daily.  hydroCHLOROthiazide (HYDRODIURIL) 12.5 mg tablet Take 1 Tab by mouth daily.  Blood-Glucose Meter monitoring kit Re: DM .00. Check once daily.  glucose blood VI test strips (BLOOD GLUCOSE TEST) strip Re: DM .00. Check once daily.  Lancets misc Re: DM .00. Check once daily.  aspirin delayed-release 81 mg tablet Take  by mouth daily.     Cholecalciferol, Vitamin D3, (VITAMIN D3) 1,000 unit cap Take 1,000 Units by mouth daily.  dimethyl fumarate (TECFIDERA) 240 mg cpDR Take  by mouth two (2) times a day. No current facility-administered medications for this visit. She has not been taking Tecfidera for many months. No Known Allergies     Family History   Problem Relation Age of Onset    Cancer Mother      breast    Diabetes Father     Hypertension Father     Heart Disease Father     Diabetes Sister     Diabetes Brother     Hypertension Sister         Social History   Substance Use Topics    Smoking status: Never Smoker    Smokeless tobacco: Never Used    Alcohol use No       Review of Systems:   Constitutional: No fever, chills, night sweats, dizziness. Ear/Nose/Throat: Sinus congestion & discharge as above; no earache, lesions, new speaking or hearing problems, nose bleeds. Cardiovascular: No angina, palpitations, PND, orthopnea, lightheadedness, edema, claudication. Respiratory: No pleurisy, hemoptysis. Gastrointestinal: No nausea/ vomiting, bowel habit change, pain, MIRLELA symptoms, melena, hematochezia, anorexia. Neurological: No seizures, speech abnormality, incontinence. Psychiatric: No agitation, confusion/disorientation, suicidal or homicidal ideation. Musculoskeletal[de-identified] denies any other complaints. Endocrine: Denies any other complaints. OBJECTIVE:  Physical Exam:   Constitutional: General Appearance:  well developed, execissively obese, nontoxic, in no acute distress. Visit Vitals    /80 (BP 1 Location: Left arm, BP Patient Position: Sitting)    Pulse 77    Temp 98.5 °F (36.9 °C) (Oral)    Resp 16    Ht 5' 8\" (1.727 m)    Wt 272 lb (123.4 kg)    SpO2 98%    BMI 41.36 kg/m2     Otoscopic Examination: external auditory canals are clear; tympanic membranes are dull. Nasal Cavity: mildly swollen mucosa & turbinates. Maxillas are not tender w/o redness or heat. Throat: clear tonsils, oropharynx, posterior pharynx.     Pulmonary: Respiratory effort: normal; no dyspnea, no retractions, no accessory muscle use. Auscultation: normal & symmetrical air exchange; no rales, no rhonchi, no wheeze; no rubs    Cardiovascular: Palpation: PMI not displaced or enlarged, no thrills or heaves. Auscultation: RRR; no murmur, rubs or gallops. Extremities: no edema, no active varicosity. N nithya: carotid arteries- normal volume & without bruit; no JVD. Gastrointestinal: Normal bowel sounds. No masses; no tenderness; no rebound/rigidity; no CVA tenderness. No hepatosplenomegaly. Psychiatric: Oriented to time, place and person. Normal mood, no agitation or anxiety. Normal affect. Pleasant and cooperative. Neurologic: CN I to XII: intact. DTR: symmetrical & normal.  No focal weakness. Musculoskeletal: NC/AT. Neck-supple. Lab Results   Component Value Date/Time    TSH 2.18 05/03/2018 08:19 AM     Lab Results   Component Value Date/Time    Cholesterol, total 145 05/03/2018 08:19 AM    HDL Cholesterol 51 05/03/2018 08:19 AM    LDL, calculated 76.2 05/03/2018 08:19 AM    VLDL, calculated 17.8 05/03/2018 08:19 AM    Triglyceride 89 05/03/2018 08:19 AM    CHOL/HDL Ratio 2.8 05/03/2018 08:19 AM     Lab Results   Component Value Date/Time    Hemoglobin A1c 7.3 (H) 05/03/2018 08:19 AM    Hemoglobin A1c (POC) 6.3 10/10/2017 09:01 AM     Lab Results   Component Value Date/Time    Sodium 139 05/03/2018 08:19 AM    Potassium 4.2 05/03/2018 08:19 AM    Chloride 100 05/03/2018 08:19 AM    CO2 28 05/03/2018 08:19 AM    Anion gap 11 05/03/2018 08:19 AM    Glucose 141 (H) 05/03/2018 08:19 AM    BUN 17 05/03/2018 08:19 AM    Creatinine 0.64 05/03/2018 08:19 AM    BUN/Creatinine ratio 27 (H) 05/03/2018 08:19 AM    GFR est AA >60 05/03/2018 08:19 AM    GFR est non-AA >60 05/03/2018 08:19 AM    Calcium 8.7 05/03/2018 08:19 AM    Bilirubin, total 0.4 05/03/2018 08:19 AM    AST (SGOT) 14 (L) 05/03/2018 08:19 AM    Alk.  phosphatase 104 05/03/2018 08:19 AM Protein, total 7.7 05/03/2018 08:19 AM    Albumin 3.3 (L) 05/03/2018 08:19 AM    Globulin 4.4 (H) 05/03/2018 08:19 AM    A-G Ratio 0.8 05/03/2018 08:19 AM    ALT (SGPT) 29 05/03/2018 08:19 AM     Lab Results   Component Value Date/Time    WBC 15.1 (H) 05/03/2018 08:19 AM    HGB 13.3 05/03/2018 08:19 AM    HCT 41.5 05/03/2018 08:19 AM    PLATELET 817 29/21/0061 08:19 AM    MCV 86.8 05/03/2018 08:19 AM         ASSESSMENT:     1. Acute sinusitis, recurrence not specified, unspecified location    2. Bronchospasm    3. Cough        PLAN:     Pharmacologic Management: Medications reviewed with the patient. Add Augmentin 875 mg twice a day. Albuterol HFA 2 puffs 4 times a day as needed. Tessalon 200 mg 3 times daily as needed. Orders Placed This Encounter    amoxicillin-clavulanate (AUGMENTIN) 875-125 mg per tablet    benzonatate (TESSALON) 200 mg capsule    albuterol (PROVENTIL HFA, VENTOLIN HFA, PROAIR HFA) 90 mcg/actuation inhaler     Discussed DDx, follow-up & work-up. Discussed risk/benefit & side effect of treatment. Follow up visit as planned, prn sooner. Rest and push fluids. Health risk from non adherence discussed again. Patient voiced understanding. Follow-up Disposition:  Return if symptoms worsen or fail to improve.     Ricarda Eid MD

## 2018-05-08 NOTE — PROGRESS NOTES
Lab results discussed with patient during visit yesterday. She will take her vitamin supplement regularly, follow diet better and drink more fluids. She says her WBC count is up from steroids, like in the past.  Follow up advised. Patent voiced understanding.

## 2018-06-14 ENCOUNTER — HOSPITAL ENCOUNTER (OUTPATIENT)
Dept: LAB | Age: 58
Discharge: HOME | End: 2018-06-14
Payer: COMMERCIAL

## 2018-06-14 ENCOUNTER — OFFICE VISIT (OUTPATIENT)
Dept: FAMILY MEDICINE CLINIC | Age: 58
End: 2018-06-14

## 2018-06-14 VITALS
RESPIRATION RATE: 16 BRPM | OXYGEN SATURATION: 97 % | SYSTOLIC BLOOD PRESSURE: 122 MMHG | WEIGHT: 274 LBS | DIASTOLIC BLOOD PRESSURE: 80 MMHG | HEIGHT: 68 IN | HEART RATE: 69 BPM | BODY MASS INDEX: 41.52 KG/M2 | TEMPERATURE: 97 F

## 2018-06-14 DIAGNOSIS — E78.5 HYPERLIPIDEMIA, UNSPECIFIED HYPERLIPIDEMIA TYPE: ICD-10-CM

## 2018-06-14 DIAGNOSIS — E55.9 HYPOVITAMINOSIS D: ICD-10-CM

## 2018-06-14 DIAGNOSIS — G35 MULTIPLE SCLEROSIS (HCC): ICD-10-CM

## 2018-06-14 DIAGNOSIS — E11.9 TYPE 2 DIABETES MELLITUS WITHOUT COMPLICATION, WITHOUT LONG-TERM CURRENT USE OF INSULIN (HCC): ICD-10-CM

## 2018-06-14 DIAGNOSIS — I10 ESSENTIAL HYPERTENSION: Primary | ICD-10-CM

## 2018-06-14 DIAGNOSIS — Z12.31 SCREENING MAMMOGRAM, ENCOUNTER FOR: ICD-10-CM

## 2018-06-14 DIAGNOSIS — I10 ESSENTIAL HYPERTENSION: ICD-10-CM

## 2018-06-14 LAB
ANION GAP SERPL CALC-SCNC: 10 MMOL/L (ref 3–18)
BASOPHILS # BLD: 0.1 K/UL (ref 0–0.06)
BASOPHILS NFR BLD: 1 % (ref 0–2)
BUN SERPL-MCNC: 20 MG/DL (ref 7–18)
BUN/CREAT SERPL: 23 (ref 12–20)
CALCIUM SERPL-MCNC: 9 MG/DL (ref 8.5–10.1)
CHLORIDE SERPL-SCNC: 100 MMOL/L (ref 100–108)
CO2 SERPL-SCNC: 27 MMOL/L (ref 21–32)
CREAT SERPL-MCNC: 0.86 MG/DL (ref 0.6–1.3)
DIFFERENTIAL METHOD BLD: ABNORMAL
EOSINOPHIL # BLD: 0.3 K/UL (ref 0–0.4)
EOSINOPHIL NFR BLD: 3 % (ref 0–5)
ERYTHROCYTE [DISTWIDTH] IN BLOOD BY AUTOMATED COUNT: 14.4 % (ref 11.6–14.5)
GLUCOSE SERPL-MCNC: 221 MG/DL (ref 74–99)
HCT VFR BLD AUTO: 40.8 % (ref 35–45)
HGB BLD-MCNC: 13.3 G/DL (ref 12–16)
LYMPHOCYTES # BLD: 2.1 K/UL (ref 0.9–3.6)
LYMPHOCYTES NFR BLD: 17 % (ref 21–52)
MCH RBC QN AUTO: 28.2 PG (ref 24–34)
MCHC RBC AUTO-ENTMCNC: 32.6 G/DL (ref 31–37)
MCV RBC AUTO: 86.6 FL (ref 74–97)
MONOCYTES # BLD: 1 K/UL (ref 0.05–1.2)
MONOCYTES NFR BLD: 8 % (ref 3–10)
NEUTS SEG # BLD: 8.5 K/UL (ref 1.8–8)
NEUTS SEG NFR BLD: 71 % (ref 40–73)
PLATELET # BLD AUTO: 366 K/UL (ref 135–420)
PMV BLD AUTO: 10.5 FL (ref 9.2–11.8)
POTASSIUM SERPL-SCNC: 4.5 MMOL/L (ref 3.5–5.5)
RBC # BLD AUTO: 4.71 M/UL (ref 4.2–5.3)
SODIUM SERPL-SCNC: 137 MMOL/L (ref 136–145)
WBC # BLD AUTO: 11.9 K/UL (ref 4.6–13.2)

## 2018-06-14 PROCEDURE — 80048 BASIC METABOLIC PNL TOTAL CA: CPT | Performed by: FAMILY MEDICINE

## 2018-06-14 PROCEDURE — 85025 COMPLETE CBC W/AUTO DIFF WBC: CPT | Performed by: FAMILY MEDICINE

## 2018-06-14 PROCEDURE — 36415 COLL VENOUS BLD VENIPUNCTURE: CPT | Performed by: FAMILY MEDICINE

## 2018-06-14 RX ORDER — METFORMIN HYDROCHLORIDE 500 MG/1
500 TABLET ORAL 2 TIMES DAILY WITH MEALS
Qty: 60 TAB | Refills: 2 | Status: SHIPPED | OUTPATIENT
Start: 2018-06-14 | End: 2019-03-13 | Stop reason: SDUPTHER

## 2018-06-14 NOTE — PROGRESS NOTES
Justina Albright is a 60 y.o. female presents to office for follow up on DM, HTN and hyperlipidemia.       Medication list has been reviewed with Justina Albright and updated as of today's date     Health Maintenance items with a due date reviewed with patient:  Health Maintenance Due   Topic Date Due    DTaP/Tdap/Td series (1 - Tdap) 05/19/1980    BREAST CANCER SCRN MAMMOGRAM  09/07/2018

## 2018-06-14 NOTE — PROGRESS NOTES
SUBJECTIVE:  Elisa Hopkins is a 60y.o. year old female   Chief Complaint   Patient presents with    Diabetes    Cholesterol Problem    Hypertension       History of Present Illness:       She is taking her medications and her BP is controlled. She has not been checking her blood sugar. She says she has been following lifestyle modification for diabetes and hypertension and hyperlipidemia. She has not seen neurology for last few months. She has gotten multiple Lumbar steroid injections from physiatry. Her pain is better. She is still not taking her vitamin D. No Systemic, Cardiovascular or Respiratory symptoms. Past Medical History:   Diagnosis Date    Cephalgia     Hyperlipemia     mild    Hypertension     IFG (impaired fasting glucose)     MS [facility] sclerosis) ([facility])     Optic neuritis     Tendonitis of shoulder      Past Surgical History:   Procedure Laterality Date    HX HYSTERECTOMY      fibroids and endometriosis        Current Outpatient Prescriptions   Medication Sig    albuterol (PROVENTIL HFA, VENTOLIN HFA, PROAIR HFA) 90 mcg/actuation inhaler Take 2 Puffs by inhalation four (4) times daily as needed for Wheezing.  metoprolol succinate (TOPROL-XL) 100 mg tablet TAKE 1 TABLET BY MOUTH DAILY    lisinopril (PRINIVIL, ZESTRIL) 40 mg tablet Take 1 Tab by mouth daily.  hydroCHLOROthiazide (HYDRODIURIL) 12.5 mg tablet Take 1 Tab by mouth daily.  Blood-Glucose Meter monitoring kit Re: DM .00. Check once daily.  glucose blood VI test strips (BLOOD GLUCOSE TEST) strip Re: DM .00. Check once daily.  Lancets misc Re: DM .00. Check once daily.  aspirin delayed-release 81 mg tablet Take  by mouth daily.  Cholecalciferol, Vitamin D3, (VITAMIN D3) 1,000 unit cap Take 1,000 Units by mouth daily. No current facility-administered medications for this visit.     SNo Known Allergies     Family History   Problem Relation Age of Onset    Cancer Mother      breast    Diabetes Father     Hypertension Father     Heart Disease Father     Diabetes Sister     Diabetes Brother     Hypertension Sister         Social History   Substance Use Topics    Smoking status: Never Smoker    Smokeless tobacco: Never Used    Alcohol use No       Review of Systems:   Constitutional: No fever, chills, night sweats, malaise, dizziness. Cardiovascular: No angina, palpitations, PND, orthopnea, lightheadedness, edema, claudication. Respiratory: No dyspnea, wheeze, pleurisy, hemoptysis, unusual cough or sputum. Gastrointestinal: No nausea/ vomiting, bowel habit change, pain, MIRELLA symptoms, melena, hematochezia, anorexia. Neurological: No seizures, speech abnormality, incontinence. Psychiatric: No agitation, confusion/disorientation, suicidal or homicidal ideation. Musculoskeletal[de-identified] denies any other complaints. Endocrine: Denies any complaints. OBJECTIVE:  Physical Exam:   Constitutional: General Appearance:  well developed, execissively obese, nontoxic, in no acute distress. Visit Vitals    /80 (BP 1 Location: Right arm, BP Patient Position: Sitting)    Pulse 69    Temp 97 °F (36.1 °C) (Oral)    Resp 16    Ht 5' 8\" (1.727 m)    Wt 274 lb (124.3 kg)    SpO2 97%    BMI 41.66 kg/m2     Pulmonary: Respiratory effort: normal; no dyspnea, no retractions, no accessory muscle use. Auscultation: normal & symmetrical air exchange; no rales, no rhonchi, no wheeze; no rubs    Cardiovascular: Palpation: PMI not displaced or enlarged, no thrills or heaves. Auscultation: RRR; no murmur, rubs or gallops. Extremities: no edema, no active varicosity. N nithya: carotid arteries- normal volume & without bruit; no JVD. Gastrointestinal: Normal bowel sounds. No masses; no tenderness; no rebound/rigidity; no CVA tenderness. No hepatosplenomegaly. Psychiatric: Oriented to time, place and person. Normal mood, no agitation or anxiety.   Normal affect. Pleasant and cooperative. Neurologic: CN I to XII: intact. DTR: symmetrical & normal.  No focal weakness. Musculoskeletal: NC/AT. Neck-supple. Lab Results   Component Value Date/Time    TSH 2.18 05/03/2018 08:19 AM     Lab Results   Component Value Date/Time    Cholesterol, total 145 05/03/2018 08:19 AM    HDL Cholesterol 51 05/03/2018 08:19 AM    LDL, calculated 76.2 05/03/2018 08:19 AM    VLDL, calculated 17.8 05/03/2018 08:19 AM    Triglyceride 89 05/03/2018 08:19 AM    CHOL/HDL Ratio 2.8 05/03/2018 08:19 AM     Lab Results   Component Value Date/Time    Hemoglobin A1c 7.3 (H) 05/03/2018 08:19 AM    Hemoglobin A1c (POC) 6.3 10/10/2017 09:01 AM     Lab Results   Component Value Date/Time    Sodium 139 05/03/2018 08:19 AM    Potassium 4.2 05/03/2018 08:19 AM    Chloride 100 05/03/2018 08:19 AM    CO2 28 05/03/2018 08:19 AM    Anion gap 11 05/03/2018 08:19 AM    Glucose 141 (H) 05/03/2018 08:19 AM    BUN 17 05/03/2018 08:19 AM    Creatinine 0.64 05/03/2018 08:19 AM    BUN/Creatinine ratio 27 (H) 05/03/2018 08:19 AM    GFR est AA >60 05/03/2018 08:19 AM    GFR est non-AA >60 05/03/2018 08:19 AM    Calcium 8.7 05/03/2018 08:19 AM    Bilirubin, total 0.4 05/03/2018 08:19 AM    AST (SGOT) 14 (L) 05/03/2018 08:19 AM    Alk. phosphatase 104 05/03/2018 08:19 AM    Protein, total 7.7 05/03/2018 08:19 AM    Albumin 3.3 (L) 05/03/2018 08:19 AM    Globulin 4.4 (H) 05/03/2018 08:19 AM    A-G Ratio 0.8 05/03/2018 08:19 AM    ALT (SGPT) 29 05/03/2018 08:19 AM     Lab Results   Component Value Date/Time    WBC 15.1 (H) 05/03/2018 08:19 AM    HGB 13.3 05/03/2018 08:19 AM    HCT 41.5 05/03/2018 08:19 AM    PLATELET 101 25/96/5194 08:19 AM    MCV 86.8 05/03/2018 08:19 AM     Lab Results   Component Value Date/Time    Vitamin D 25-Hydroxy 14.9 (L) 05/03/2018 08:19 AM       ASSESSMENT:     1. Essential hypertension    2. Hyperlipidemia, unspecified hyperlipidemia type    3.  Type 2 diabetes mellitus without complication, without long-term current use of insulin ([facility]    4. Multiple sclerosis ([facility]    5. Hypovitaminosis D      PLAN:     Pharmacologic Management: Medications reviewed with the patient. No change. She declines a Statin and antidiabetic medication. Orders Placed This Encounter    KOBE MAMMO BI SCREENING INCL CAD    CBC WITH AUTOMATED DIFF    GLUCOSE, RANDOM    METABOLIC PANEL, BASIC     HM: she has Rx for Tdap. Discussed DDx, follow-up & work-up. Discussed risk/benefit & side effect of treatment. Follow up visit as planned, prn sooner. Low salt ADA diet, weightloss & graduated excecise. Health risk from non adherence discussed again. Patient voiced understanding. Follow-up Disposition:  Return in about 2 months (around 8/14/2018).     Katelyn Yost MD

## 2018-06-15 ENCOUNTER — TELEPHONE (OUTPATIENT)
Dept: FAMILY MEDICINE CLINIC | Age: 58
End: 2018-06-15

## 2018-06-15 NOTE — TELEPHONE ENCOUNTER
----- Message from Barb Norman MD sent at 6/14/2018  5:20 PM EDT -----  White count is improving. Near normal.  Glucose was quite high and last HgbA!c was high. Will start on metformin 500 mg twice daily. Sent to pharmacy. Need to check glucose regularly. Stay well hydrated. Will need follow up.

## 2018-11-14 RX ORDER — METOPROLOL SUCCINATE 100 MG/1
TABLET, EXTENDED RELEASE ORAL
Qty: 30 TAB | Refills: 0 | Status: SHIPPED | OUTPATIENT
Start: 2018-11-14 | End: 2019-03-13 | Stop reason: SDUPTHER

## 2018-11-14 RX ORDER — LISINOPRIL 40 MG/1
40 TABLET ORAL DAILY
Qty: 30 TAB | Refills: 0 | Status: SHIPPED | OUTPATIENT
Start: 2018-11-14 | End: 2019-01-02

## 2018-11-14 RX ORDER — HYDROCHLOROTHIAZIDE 12.5 MG/1
12.5 TABLET ORAL DAILY
Qty: 30 TAB | Refills: 0 | Status: SHIPPED | OUTPATIENT
Start: 2018-11-14 | End: 2019-03-13 | Stop reason: SDUPTHER

## 2018-11-14 NOTE — TELEPHONE ENCOUNTER
She canceled appointment on 8/15/2018 and was not seen for 8/23/2018 appointment. She needs to reschedule now.

## 2018-11-14 NOTE — TELEPHONE ENCOUNTER
Pharmacy faxed a refill request for,  Requested Prescriptions     Pending Prescriptions Disp Refills    metoprolol succinate (TOPROL-XL) 100 mg tablet 90 Tab 1     Sig: TAKE 1 TABLET BY MOUTH DAILY    lisinopril (PRINIVIL, ZESTRIL) 40 mg tablet 90 Tab 1     Sig: Take 1 Tab by mouth daily.  hydroCHLOROthiazide (HYDRODIURIL) 12.5 mg tablet 90 Tab 1     Sig: Take 1 Tab by mouth daily.        Please advise

## 2019-01-02 NOTE — TELEPHONE ENCOUNTER
MEDICATION REFILL REQUEST- 90 day supply for insurance to cover    Requested Prescriptions     Pending Prescriptions Disp Refills    lisinopril (PRINIVIL, ZESTRIL) 40 mg tablet 90 Tab 1     Sig: Take 1 Tab by mouth daily for 90 days. Last Office Visit: 8/23/2018    No future appointments.     Forwarded pended medication request to provider for approval.

## 2019-01-04 RX ORDER — LISINOPRIL 40 MG/1
40 TABLET ORAL DAILY
Qty: 90 TAB | Refills: 1 | Status: SHIPPED | OUTPATIENT
Start: 2019-01-04 | End: 2019-04-04

## 2019-03-12 ENCOUNTER — OFFICE VISIT (OUTPATIENT)
Dept: FAMILY MEDICINE CLINIC | Age: 59
End: 2019-03-12

## 2019-03-13 ENCOUNTER — OFFICE VISIT (OUTPATIENT)
Dept: FAMILY MEDICINE CLINIC | Age: 59
End: 2019-03-13

## 2019-03-13 ENCOUNTER — HOSPITAL ENCOUNTER (OUTPATIENT)
Dept: LAB | Age: 59
Discharge: HOME | End: 2019-03-13
Payer: COMMERCIAL

## 2019-03-13 VITALS
DIASTOLIC BLOOD PRESSURE: 90 MMHG | WEIGHT: 277.6 LBS | SYSTOLIC BLOOD PRESSURE: 160 MMHG | OXYGEN SATURATION: 97 % | HEIGHT: 68 IN | HEART RATE: 70 BPM | RESPIRATION RATE: 19 BRPM | TEMPERATURE: 98.2 F | BODY MASS INDEX: 42.07 KG/M2

## 2019-03-13 DIAGNOSIS — E78.5 HYPERLIPIDEMIA, UNSPECIFIED HYPERLIPIDEMIA TYPE: ICD-10-CM

## 2019-03-13 DIAGNOSIS — E66.01 OBESITY, MORBID (HCC): ICD-10-CM

## 2019-03-13 DIAGNOSIS — Z12.31 SCREENING MAMMOGRAM, ENCOUNTER FOR: Primary | ICD-10-CM

## 2019-03-13 DIAGNOSIS — I10 ESSENTIAL HYPERTENSION: ICD-10-CM

## 2019-03-13 DIAGNOSIS — G35 MULTIPLE SCLEROSIS (HCC): ICD-10-CM

## 2019-03-13 DIAGNOSIS — E11.9 TYPE 2 DIABETES MELLITUS WITHOUT COMPLICATION, WITHOUT LONG-TERM CURRENT USE OF INSULIN (HCC): ICD-10-CM

## 2019-03-13 LAB
BASOPHILS # BLD: 0.1 K/UL (ref 0–0.1)
BASOPHILS NFR BLD: 0 % (ref 0–2)
DIFFERENTIAL METHOD BLD: ABNORMAL
EOSINOPHIL # BLD: 0.3 K/UL (ref 0–0.4)
EOSINOPHIL NFR BLD: 2 % (ref 0–5)
ERYTHROCYTE [DISTWIDTH] IN BLOOD BY AUTOMATED COUNT: 14.3 % (ref 11.6–14.5)
HCT VFR BLD AUTO: 40.5 % (ref 35–45)
HGB BLD-MCNC: 13 G/DL (ref 12–16)
LYMPHOCYTES # BLD: 2.7 K/UL (ref 0.9–3.6)
LYMPHOCYTES NFR BLD: 22 % (ref 21–52)
MCH RBC QN AUTO: 28 PG (ref 24–34)
MCHC RBC AUTO-ENTMCNC: 32.1 G/DL (ref 31–37)
MCV RBC AUTO: 87.1 FL (ref 74–97)
MONOCYTES # BLD: 0.8 K/UL (ref 0.05–1.2)
MONOCYTES NFR BLD: 6 % (ref 3–10)
NEUTS SEG # BLD: 8.8 K/UL (ref 1.8–8)
NEUTS SEG NFR BLD: 70 % (ref 40–73)
PLATELET # BLD AUTO: 396 K/UL (ref 135–420)
PMV BLD AUTO: 10.8 FL (ref 9.2–11.8)
RBC # BLD AUTO: 4.65 M/UL (ref 4.2–5.3)
WBC # BLD AUTO: 12.7 K/UL (ref 4.6–13.2)

## 2019-03-13 PROCEDURE — 85025 COMPLETE CBC W/AUTO DIFF WBC: CPT

## 2019-03-13 PROCEDURE — 83036 HEMOGLOBIN GLYCOSYLATED A1C: CPT

## 2019-03-13 PROCEDURE — 84443 ASSAY THYROID STIM HORMONE: CPT

## 2019-03-13 PROCEDURE — 36415 COLL VENOUS BLD VENIPUNCTURE: CPT

## 2019-03-13 PROCEDURE — 80053 COMPREHEN METABOLIC PANEL: CPT

## 2019-03-13 PROCEDURE — 80061 LIPID PANEL: CPT

## 2019-03-13 RX ORDER — HYDROCHLOROTHIAZIDE 12.5 MG/1
12.5 TABLET ORAL DAILY
Qty: 90 TAB | Refills: 0 | Status: SHIPPED | OUTPATIENT
Start: 2019-03-13 | End: 2019-06-28 | Stop reason: SDUPTHER

## 2019-03-13 RX ORDER — METOPROLOL SUCCINATE 100 MG/1
TABLET, EXTENDED RELEASE ORAL
Qty: 90 TAB | Refills: 0 | Status: SHIPPED | OUTPATIENT
Start: 2019-03-13 | End: 2020-03-31 | Stop reason: SDUPTHER

## 2019-03-13 RX ORDER — NITROFURANTOIN 25; 75 MG/1; MG/1
1 CAPSULE ORAL 2 TIMES DAILY
Refills: 0 | COMMUNITY
Start: 2019-03-11 | End: 2019-03-13

## 2019-03-13 RX ORDER — METFORMIN HYDROCHLORIDE 500 MG/1
500 TABLET ORAL 2 TIMES DAILY WITH MEALS
Qty: 180 TAB | Refills: 0 | Status: SHIPPED | OUTPATIENT
Start: 2019-03-13 | End: 2021-01-20

## 2019-03-13 RX ORDER — ERGOCALCIFEROL 1.25 MG/1
50000 CAPSULE ORAL
Refills: 3 | COMMUNITY
Start: 2019-02-19

## 2019-03-13 NOTE — PROGRESS NOTES
Janet Flores is a 60 y.o. female presents in office for f/u. Health Maintenance Due   Topic Date Due    DTaP/Tdap/Td series (1 - Tdap) 05/19/1980    Shingrix Vaccine Age 50> (1 of 2) 05/19/2009    Influenza Age 5 to Adult  08/01/2018    BREAST CANCER SCRN MAMMOGRAM  09/07/2018    FOOT EXAM Q1  10/10/2018    MICROALBUMIN Q1  10/10/2018    HEMOGLOBIN A1C Q6M  11/03/2018       1. Have you been to the ER, urgent care clinic since your last visit? Hospitalized since your last visit? No    2. Have you seen or consulted any other health care providers outside of the [address] [place] [address] since your last visit? Include any pap smears or colon screening. Yes. Neuro.

## 2019-03-13 NOTE — PROGRESS NOTES
SUBJECTIVE:  Darrion Siegel is a 60y.o. year old female   Chief Complaint   Patient presents with    Hypertension    Cholesterol Problem    Diabetes       History of Present Illness:     She has been lost to follow-up again. She is not taking her medications regularly and her BP is not controlled. In regards to diabetes, she has not been checking her blood sugar. She has not been taking metformin. She says she has not been following lifestyle modification for diabetes and hypertension and hyperlipidemia. She declines medication for lipids. She has seen neurology for the last few months. She is started back on her vitamin D from neurologist about a month ago. No Systemic, Cardiovascular or Respiratory symptoms. Past Medical History:   Diagnosis Date    Cephalgia     Hyperlipemia     mild    Hypertension     IFG (impaired fasting glucose)     MS (multiple sclerosis) (HCC)     Optic neuritis     Tendonitis of shoulder      Past Surgical History:   Procedure Laterality Date    HX HYSTERECTOMY      fibroids and endometriosis        Current Outpatient Medications   Medication Sig    ergocalciferol (ERGOCALCIFEROL) 50,000 unit capsule Take 50,000 Units by mouth every seven (7) days.  lisinopril (PRINIVIL, ZESTRIL) 40 mg tablet Take 1 Tab by mouth daily for 90 days.  metoprolol succinate (TOPROL-XL) 100 mg tablet TAKE 1 TABLET BY MOUTH DAILY    hydroCHLOROthiazide (HYDRODIURIL) 12.5 mg tablet Take 1 Tab by mouth daily.  metFORMIN (GLUCOPHAGE) 500 mg tablet Take 1 Tab by mouth two (2) times daily (with meals).  albuterol (PROVENTIL HFA, VENTOLIN HFA, PROAIR HFA) 90 mcg/actuation inhaler Take 2 Puffs by inhalation four (4) times daily as needed for Wheezing.  Blood-Glucose Meter monitoring kit Re: DM .00. Check once daily.  glucose blood VI test strips (BLOOD GLUCOSE TEST) strip Re: DM .00. Check once daily.  Lancets misc Re: DM .00.   Check once daily.    aspirin delayed-release 81 mg tablet Take  by mouth daily. No current facility-administered medications for this visit. SNo Known Allergies     Family History   Problem Relation Age of Onset    Cancer Mother         breast    Diabetes Father     Hypertension Father     Heart Disease Father     Diabetes Sister     Diabetes Brother     Hypertension Sister         Social History     Tobacco Use    Smoking status: Never Smoker    Smokeless tobacco: Never Used   Substance Use Topics    Alcohol use: No     Alcohol/week: 0.0 oz       Review of Systems:   Constitutional: No fever, chills, night sweats, malaise, dizziness. Cardiovascular: No angina, palpitations, PND, orthopnea, lightheadedness, edema, claudication. Respiratory: No dyspnea, wheeze, pleurisy, hemoptysis, unusual cough or sputum. Gastrointestinal: No nausea/ vomiting, bowel habit change, pain, MIRELLA symptoms, melena, hematochezia, anorexia. Neurological: No seizures, speech abnormality, incontinence. Psychiatric: No agitation, confusion/disorientation, suicidal or homicidal ideation. Musculoskeletal[de-identified] denies any other complaints. Endocrine: Denies any other complaints. OBJECTIVE:  Physical Exam:   Constitutional: General Appearance:  well developed, execissively obese, nontoxic, in no acute distress. Visit Vitals  /90 (BP 1 Location: Left arm, BP Patient Position: Sitting)   Pulse 70   Temp 98.2 °F (36.8 °C) (Oral)   Resp 19   Ht 5' 8\" (1.727 m)   Wt 277 lb 9.6 oz (125.9 kg)   SpO2 97%   BMI 42.21 kg/m²     Pulmonary: Respiratory effort: normal; no dyspnea, no retractions, no accessory muscle use. Auscultation: normal & symmetrical air exchange; no rales, no rhonchi, no wheeze; no rubs    Cardiovascular: Palpation: PMI not displaced or enlarged, no thrills or heaves. Auscultation: RRR; no murmur, rubs or gallops. Extremities: no edema, no active varicosity.  N nithya: carotid arteries- normal volume & without bruit; no JVD. Gastrointestinal: Normal bowel sounds. No masses; no tenderness; no rebound/rigidity; no CVA tenderness. No hepatosplenomegaly. Psychiatric: Oriented to time, place and person. Normal mood, no agitation or anxiety. Normal affect. Pleasant and cooperative. Neurologic: CN I to XII: intact. DTR: symmetrical & normal.  No focal weakness. Musculoskeletal: NC/AT. Neck-supple. Diabetic foot exam:     Left Foot:   Visual Exam: normal  small bunion. Pulse DP: 2+ (normal)   Filament test: normal sensation    Vibratory sensation: diminished      Right Foot:   Visual Exam: normal Small bunion. Pulse DP: 2+ (normal)   Filament test: normal sensation    Vibratory sensation: diminished        Lab Results   Component Value Date/Time    TSH 2.18 05/03/2018 08:19 AM     Lab Results   Component Value Date/Time    Cholesterol, total 145 05/03/2018 08:19 AM    HDL Cholesterol 51 05/03/2018 08:19 AM    LDL, calculated 76.2 05/03/2018 08:19 AM    VLDL, calculated 17.8 05/03/2018 08:19 AM    Triglyceride 89 05/03/2018 08:19 AM    CHOL/HDL Ratio 2.8 05/03/2018 08:19 AM     Lab Results   Component Value Date/Time    Hemoglobin A1c 7.3 (H) 05/03/2018 08:19 AM    Hemoglobin A1c (POC) 6.3 10/10/2017 09:01 AM     Lab Results   Component Value Date/Time    Sodium 137 06/14/2018 08:19 AM    Potassium 4.5 06/14/2018 08:19 AM    Chloride 100 06/14/2018 08:19 AM    CO2 27 06/14/2018 08:19 AM    Anion gap 10 06/14/2018 08:19 AM    Glucose 221 (H) 06/14/2018 08:19 AM    BUN 20 (H) 06/14/2018 08:19 AM    Creatinine 0.86 06/14/2018 08:19 AM    BUN/Creatinine ratio 23 (H) 06/14/2018 08:19 AM    GFR est AA >60 06/14/2018 08:19 AM    GFR est non-AA >60 06/14/2018 08:19 AM    Calcium 9.0 06/14/2018 08:19 AM    Bilirubin, total 0.4 05/03/2018 08:19 AM    AST (SGOT) 14 (L) 05/03/2018 08:19 AM    Alk.  phosphatase 104 05/03/2018 08:19 AM    Protein, total 7.7 05/03/2018 08:19 AM    Albumin 3.3 (L) 05/03/2018 08:19 AM    Globulin 4.4 (H) 05/03/2018 08:19 AM    A-G Ratio 0.8 05/03/2018 08:19 AM    ALT (SGPT) 29 05/03/2018 08:19 AM     Lab Results   Component Value Date/Time    WBC 11.9 06/14/2018 08:19 AM    HGB 13.3 06/14/2018 08:19 AM    HCT 40.8 06/14/2018 08:19 AM    PLATELET 993 11/86/9905 08:19 AM    MCV 86.6 06/14/2018 08:19 AM     Lab Results   Component Value Date/Time    Vitamin D 25-Hydroxy 14.9 (L) 05/03/2018 08:19 AM       ASSESSMENT:     1. Screening mammogram, encounter for    2. Type 2 diabetes mellitus without complication, without long-term current use of insulin ([facility] [address]    3. Essential hypertension    4. Hyperlipidemia, unspecified hyperlipidemia type    5. Multiple sclerosis (HCC)    6. Obesity, morbid ([facility] [address]      PLAN:     Pharmacologic Management: Medications reviewed with the patient. No change. She declines a Statin. Orders Placed This Encounter    KOBE MAMMO BI SCREENING INCL CAD    METABOLIC PANEL, COMPREHENSIVE    CBC WITH AUTOMATED DIFF    HEMOGLOBIN A1C WITH EAG    TSH 3RD GENERATION    URINALYSIS W/ RFLX MICROSCOPIC    MICROALBUMIN, UR, RAND W/ MICROALB/CREAT RATIO    LIPID PANEL     DIABETES FOOT EXAM    ergocalciferol (ERGOCALCIFEROL) 50,000 unit capsule    DISCONTD: nitrofurantoin, macrocrystal-monohydrate, (MACROBID) 100 mg capsule     HM: she has Rx for Tdap. Shingles vaccine postponed, since patient is going to get Varivax advised by her neurologist because she is not immune to chickenpox and she needs immunity to start on her new multiple sclerosis medication. Discussed DDx, follow-up & work-up. Discussed risk/benefit & side effect of treatment. Follow up visit as planned, prn sooner. Low salt ADA diet, weightloss & graduated excecise. Check glucose regularly as advised. Health risk from non adherence discussed again. Patient voiced understanding. Follow-up Disposition:  Return in about 1 month (around 4/13/2019).     Alfred May MD

## 2019-03-14 LAB
ALBUMIN SERPL-MCNC: 3.2 G/DL (ref 3.4–5)
ALBUMIN/GLOB SERPL: 0.7 {RATIO} (ref 0.8–1.7)
ALP SERPL-CCNC: 95 U/L (ref 45–117)
ALT SERPL-CCNC: 28 U/L (ref 13–56)
ANION GAP SERPL CALC-SCNC: 8 MMOL/L (ref 3–18)
AST SERPL-CCNC: 14 U/L (ref 15–37)
BILIRUB SERPL-MCNC: 0.4 MG/DL (ref 0.2–1)
BUN SERPL-MCNC: 16 MG/DL (ref 7–18)
BUN/CREAT SERPL: 23 (ref 12–20)
CALCIUM SERPL-MCNC: 8.7 MG/DL (ref 8.5–10.1)
CHLORIDE SERPL-SCNC: 103 MMOL/L (ref 100–108)
CHOLEST SERPL-MCNC: 152 MG/DL
CO2 SERPL-SCNC: 26 MMOL/L (ref 21–32)
CREAT SERPL-MCNC: 0.69 MG/DL (ref 0.6–1.3)
EST. AVERAGE GLUCOSE BLD GHB EST-MCNC: 171 MG/DL
GLOBULIN SER CALC-MCNC: 4.4 G/DL (ref 2–4)
GLUCOSE SERPL-MCNC: 149 MG/DL (ref 74–99)
HBA1C MFR BLD: 7.6 % (ref 4.2–5.6)
HDLC SERPL-MCNC: 42 MG/DL (ref 40–60)
HDLC SERPL: 3.6 {RATIO} (ref 0–5)
LDLC SERPL CALC-MCNC: 86.8 MG/DL (ref 0–100)
LIPID PROFILE,FLP: NORMAL
POTASSIUM SERPL-SCNC: 4.4 MMOL/L (ref 3.5–5.5)
PROT SERPL-MCNC: 7.6 G/DL (ref 6.4–8.2)
SODIUM SERPL-SCNC: 137 MMOL/L (ref 136–145)
TRIGL SERPL-MCNC: 116 MG/DL (ref ?–150)
TSH SERPL DL<=0.05 MIU/L-ACNC: 2.1 UIU/ML (ref 0.36–3.74)
VLDLC SERPL CALC-MCNC: 23.2 MG/DL

## 2019-03-14 NOTE — PROGRESS NOTES
Hemoglobin A1c was a little higher than a year ago. Glucose was high. The patient is advised to follow diet and exercise as tolerated and take medications regularly. Lipids were in range. Comprehensive metabolic panel was stable otherwise. Complete blood count was stable.

## 2019-04-17 ENCOUNTER — HOSPITAL ENCOUNTER (OUTPATIENT)
Dept: LAB | Age: 59
Discharge: HOME | End: 2019-04-17
Payer: OTHER GOVERNMENT

## 2019-04-17 ENCOUNTER — OFFICE VISIT (OUTPATIENT)
Dept: FAMILY MEDICINE CLINIC | Age: 59
End: 2019-04-17

## 2019-04-17 VITALS
OXYGEN SATURATION: 99 % | RESPIRATION RATE: 18 BRPM | HEART RATE: 77 BPM | HEIGHT: 68 IN | WEIGHT: 271 LBS | SYSTOLIC BLOOD PRESSURE: 136 MMHG | DIASTOLIC BLOOD PRESSURE: 84 MMHG | BODY MASS INDEX: 41.07 KG/M2 | TEMPERATURE: 97.6 F

## 2019-04-17 DIAGNOSIS — E66.01 OBESITY, MORBID (HCC): ICD-10-CM

## 2019-04-17 DIAGNOSIS — G35 MULTIPLE SCLEROSIS (HCC): ICD-10-CM

## 2019-04-17 DIAGNOSIS — K21.9 GASTROESOPHAGEAL REFLUX DISEASE WITHOUT ESOPHAGITIS: ICD-10-CM

## 2019-04-17 DIAGNOSIS — I10 ESSENTIAL HYPERTENSION: Primary | ICD-10-CM

## 2019-04-17 DIAGNOSIS — R14.2 BURPING: ICD-10-CM

## 2019-04-17 DIAGNOSIS — E11.9 TYPE 2 DIABETES MELLITUS WITHOUT COMPLICATION, WITHOUT LONG-TERM CURRENT USE OF INSULIN (HCC): ICD-10-CM

## 2019-04-17 DIAGNOSIS — E78.5 HYPERLIPIDEMIA, UNSPECIFIED HYPERLIPIDEMIA TYPE: ICD-10-CM

## 2019-04-17 DIAGNOSIS — E55.9 HYPOVITAMINOSIS D: ICD-10-CM

## 2019-04-17 LAB
APPEARANCE UR: CLEAR
BILIRUB UR QL: NEGATIVE
COLOR UR: YELLOW
GLUCOSE UR STRIP.AUTO-MCNC: NEGATIVE MG/DL
HGB UR QL STRIP: NEGATIVE
KETONES UR QL STRIP.AUTO: NEGATIVE MG/DL
LEUKOCYTE ESTERASE UR QL STRIP.AUTO: NEGATIVE
NITRITE UR QL STRIP.AUTO: NEGATIVE
PH UR STRIP: 5.5 [PH] (ref 5–8)
PROT UR STRIP-MCNC: NEGATIVE MG/DL
SP GR UR REFRACTOMETRY: 1.02 (ref 1–1.03)
UROBILINOGEN UR QL STRIP.AUTO: 0.2 EU/DL (ref 0.2–1)

## 2019-04-17 PROCEDURE — 81003 URINALYSIS AUTO W/O SCOPE: CPT

## 2019-04-17 PROCEDURE — 82043 UR ALBUMIN QUANTITATIVE: CPT

## 2019-04-17 RX ORDER — RANITIDINE 150 MG/1
150 TABLET, FILM COATED ORAL 2 TIMES DAILY
Qty: 60 TAB | Refills: 1 | Status: SHIPPED | OUTPATIENT
Start: 2019-04-17 | End: 2019-06-18 | Stop reason: SDUPTHER

## 2019-04-17 NOTE — PROGRESS NOTES
SUBJECTIVE:  Alexei Friedman is a 60y.o. year old female   Chief Complaint   Patient presents with    Hypertension    Diabetes       History of Present Illness:       She is now taking her blood pressure medications regularly and her BP is getting better. .     In regards to diabetes, she has not been checking her blood sugar. She has not been taking metformin. She says she has not been following lifestyle modification for diabetes and hypertension and hyperlipidemia. She declines medication for lipids. She has occasional regurgitation of food and has chronic burping. Her  is concerned and she wants to be referred to gastroenterology. She has been seeing neurology regularly. She is started back on her vitamin D from neurologist about a month ago. No Systemic, Cardiovascular or Respiratory symptoms. Past Medical History:   Diagnosis Date    Cephalgia     Hyperlipemia     mild    Hypertension     IFG (impaired fasting glucose)     MS (multiple sclerosis) (HCC)     Optic neuritis     Tendonitis of shoulder      Past Surgical History:   Procedure Laterality Date    HX HYSTERECTOMY      fibroids and endometriosis        Current Outpatient Medications   Medication Sig    ergocalciferol (ERGOCALCIFEROL) 50,000 unit capsule Take 50,000 Units by mouth every seven (7) days.  hydroCHLOROthiazide (HYDRODIURIL) 12.5 mg tablet Take 1 Tab by mouth daily.  metoprolol succinate (TOPROL-XL) 100 mg tablet TAKE 1 TABLET BY MOUTH DAILY    metFORMIN (GLUCOPHAGE) 500 mg tablet Take 1 Tab by mouth two (2) times daily (with meals).  albuterol (PROVENTIL HFA, VENTOLIN HFA, PROAIR HFA) 90 mcg/actuation inhaler Take 2 Puffs by inhalation four (4) times daily as needed for Wheezing.  Blood-Glucose Meter monitoring kit Re: DM .00. Check once daily.  glucose blood VI test strips (BLOOD GLUCOSE TEST) strip Re: DM .00. Check once daily.  Lancets misc Re: DM .00.   Check once daily. No current facility-administered medications for this visit. SNo Known Allergies     Family History   Problem Relation Age of Onset    Cancer Mother         breast    Diabetes Father     Hypertension Father     Heart Disease Father     Diabetes Sister     Diabetes Brother     Hypertension Sister         Social History     Tobacco Use    Smoking status: Never Smoker    Smokeless tobacco: Never Used   Substance Use Topics    Alcohol use: No     Alcohol/week: 0.0 oz       Review of Systems:   Constitutional: No fever, chills, night sweats, malaise, dizziness. Cardiovascular: No angina, palpitations, PND, orthopnea, lightheadedness, edema, claudication. Respiratory: No dyspnea, wheeze, pleurisy, hemoptysis, unusual cough or sputum. Gastrointestinal: Chronic burping and occasional regurgitation as above. No nausea/ vomiting, bowel habit change, pain, melena, hematochezia, anorexia. Neurological: No seizures, speech abnormality, incontinence. Psychiatric: No agitation, confusion/disorientation, suicidal or homicidal ideation. Musculoskeletal[de-identified] denies any other complaints. Endocrine: Denies any other complaints. OBJECTIVE:  Physical Exam:   Constitutional: General Appearance:  well developed, execissively obese, nontoxic, in no acute distress. Visit Vitals  /84 (BP 1 Location: Right arm, BP Patient Position: Sitting)   Pulse 77   Temp 97.6 °F (36.4 °C) (Oral)   Resp 18   Ht 5' 8\" (1.727 m)   Wt 271 lb (122.9 kg)   SpO2 99%   BMI 41.21 kg/m²     Pulmonary: Respiratory effort: normal; no dyspnea, no retractions, no accessory muscle use. Auscultation: normal & symmetrical air exchange; no rales, no rhonchi, no wheeze; no rubs    Cardiovascular: Palpation: PMI not displaced or enlarged, no thrills or heaves. Auscultation: RRR; no murmur, rubs or gallops. Extremities: no edema, no active varicosity.  N nithya: carotid arteries- normal volume & without bruit; no JVD.     Gastrointestinal: Normal bowel sounds. No masses; no tenderness; no rebound/rigidity; no CVA tenderness. No hepatosplenomegaly. Psychiatric: Oriented to time, place and person. Normal mood, no agitation or anxiety. Normal affect. Pleasant and cooperative. Neurologic: CN I to XII: intact. DTR: symmetrical & normal.  No focal weakness. Musculoskeletal: NC/AT. Neck-supple. Lab Results   Component Value Date/Time    TSH 2.10 03/13/2019 11:32 AM     Lab Results   Component Value Date/Time    Cholesterol, total 152 03/13/2019 11:32 AM    HDL Cholesterol 42 03/13/2019 11:32 AM    LDL, calculated 86.8 03/13/2019 11:32 AM    VLDL, calculated 23.2 03/13/2019 11:32 AM    Triglyceride 116 03/13/2019 11:32 AM    CHOL/HDL Ratio 3.6 03/13/2019 11:32 AM     Lab Results   Component Value Date/Time    Hemoglobin A1c 7.6 (H) 03/13/2019 11:32 AM    Hemoglobin A1c (POC) 6.3 10/10/2017 09:01 AM     Lab Results   Component Value Date/Time    Sodium 137 03/13/2019 11:32 AM    Potassium 4.4 03/13/2019 11:32 AM    Chloride 103 03/13/2019 11:32 AM    CO2 26 03/13/2019 11:32 AM    Anion gap 8 03/13/2019 11:32 AM    Glucose 149 (H) 03/13/2019 11:32 AM    BUN 16 03/13/2019 11:32 AM    Creatinine 0.69 03/13/2019 11:32 AM    BUN/Creatinine ratio 23 (H) 03/13/2019 11:32 AM    GFR est AA >60 03/13/2019 11:32 AM    GFR est non-AA >60 03/13/2019 11:32 AM    Calcium 8.7 03/13/2019 11:32 AM    Bilirubin, total 0.4 03/13/2019 11:32 AM    AST (SGOT) 14 (L) 03/13/2019 11:32 AM    Alk.  phosphatase 95 03/13/2019 11:32 AM    Protein, total 7.6 03/13/2019 11:32 AM    Albumin 3.2 (L) 03/13/2019 11:32 AM    Globulin 4.4 (H) 03/13/2019 11:32 AM    A-G Ratio 0.7 (L) 03/13/2019 11:32 AM    ALT (SGPT) 28 03/13/2019 11:32 AM     Lab Results   Component Value Date/Time    WBC 12.7 03/13/2019 11:32 AM    HGB 13.0 03/13/2019 11:32 AM    HCT 40.5 03/13/2019 11:32 AM    PLATELET 920 28/64/0769 11:32 AM    MCV 87.1 03/13/2019 11:32 AM Lab Results   Component Value Date/Time    Vitamin D 25-Hydroxy 14.9 (L) 05/03/2018 08:19 AM       Lab work reviewed with the patient. ASSESSMENT:     1. Essential hypertension    2. Type 2 diabetes mellitus without complication, without long-term current use of insulin ([facility] [address]    3. Gastroesophageal reflux disease without esophagitis    4. Hyperlipidemia, unspecified hyperlipidemia type    5. Burping    6. Hypovitaminosis D    7. Multiple sclerosis ([facility] [address]    8. Obesity, morbid ([facility]      PLAN:     Pharmacologic Management: Medications reviewed with the patient. Start back on Metformin. .  Try Zantac 150 twice daily while waiting to see gastroenterologist.  She declines a Statin. Orders Placed This Encounter    REFERRAL TO GASTROENTEROLOGY    raNITIdine (ZANTAC) 150 mg tablet     HM: she has Rx for Tdap. She has had Varivax. She also had her mammogram.  She is advised about eye exam.    Reflux regimen. Also advised that weight loss will help with acid reflux. Discussed DDx, follow-up & work-up. Discussed risk/benefit & side effect of treatment. Follow up visit as planned, prn sooner. Low salt ADA diet, weightloss & graduated excecise. Check glucose regularly as advised. Health risk from non adherence discussed again. Patient voiced understanding. 25 minutes were spent on face to face time with this patient for the aforementioned problems, diagnoses and management plans. >50% of the time was spent counseling and coordinating care. All questions answered with patient's satisfaction. Follow-up and Dispositions    · Return in about 2 months (around 6/17/2019).          Mohan Davis MD

## 2019-04-17 NOTE — PROGRESS NOTES
Wai Ruby is a 60 y.o. female presents to office for htn and dm  Medication list has been reviewed with Wai Ruby and updated as of today's date     Health Maintenance items with a due date reviewed with patient:  Health Maintenance Due   Topic Date Due    EYE EXAM RETINAL OR DILATED  05/19/1969    DTaP/Tdap/Td series (1 - Tdap) 05/19/1980    BREAST CANCER SCRN MAMMOGRAM  09/07/2018    MICROALBUMIN Q1  10/10/2018

## 2019-04-18 ENCOUNTER — TELEPHONE (OUTPATIENT)
Dept: FAMILY MEDICINE CLINIC | Age: 59
End: 2019-04-18

## 2019-04-18 LAB
CREAT UR-MCNC: 108 MG/DL (ref 30–125)
MICROALBUMIN UR-MCNC: 0.68 MG/DL (ref 0–3)
MICROALBUMIN/CREAT UR-RTO: 6 MG/G (ref 0–30)

## 2019-04-18 NOTE — TELEPHONE ENCOUNTER
I tried to reach the patient on the number provided [phone], multiple times. Patient does not answer her phone. Left a message to call us back.

## 2019-04-18 NOTE — TELEPHONE ENCOUNTER
Pt stated she rcv/d a chicken pox vax a couple weeks ago w/ no issues. However, she stated that after her BP was checked on the same arm yesterday, the injection site has become warm & itchy. Please call her to discuss.

## 2019-06-10 ENCOUNTER — OFFICE VISIT (OUTPATIENT)
Dept: FAMILY MEDICINE CLINIC | Age: 59
End: 2019-06-10

## 2019-06-10 ENCOUNTER — HOSPITAL ENCOUNTER (OUTPATIENT)
Dept: LAB | Age: 59
Discharge: HOME | End: 2019-06-10
Payer: COMMERCIAL

## 2019-06-10 VITALS
HEART RATE: 72 BPM | TEMPERATURE: 97.8 F | WEIGHT: 269 LBS | HEIGHT: 68 IN | BODY MASS INDEX: 40.77 KG/M2 | DIASTOLIC BLOOD PRESSURE: 82 MMHG | SYSTOLIC BLOOD PRESSURE: 144 MMHG | RESPIRATION RATE: 17 BRPM | OXYGEN SATURATION: 99 %

## 2019-06-10 DIAGNOSIS — M10.9 ACUTE GOUT INVOLVING TOE OF RIGHT FOOT, UNSPECIFIED CAUSE: Primary | ICD-10-CM

## 2019-06-10 DIAGNOSIS — I10 ESSENTIAL HYPERTENSION: ICD-10-CM

## 2019-06-10 DIAGNOSIS — K21.9 GASTROESOPHAGEAL REFLUX DISEASE WITHOUT ESOPHAGITIS: ICD-10-CM

## 2019-06-10 DIAGNOSIS — E11.9 TYPE 2 DIABETES MELLITUS WITHOUT COMPLICATION, WITHOUT LONG-TERM CURRENT USE OF INSULIN (HCC): ICD-10-CM

## 2019-06-10 DIAGNOSIS — M10.9 ACUTE GOUT INVOLVING TOE OF RIGHT FOOT, UNSPECIFIED CAUSE: ICD-10-CM

## 2019-06-10 LAB
BASOPHILS # BLD: 0.1 K/UL (ref 0–0.1)
BASOPHILS NFR BLD: 0 % (ref 0–2)
DIFFERENTIAL METHOD BLD: ABNORMAL
EOSINOPHIL # BLD: 0.3 K/UL (ref 0–0.4)
EOSINOPHIL NFR BLD: 2 % (ref 0–5)
ERYTHROCYTE [DISTWIDTH] IN BLOOD BY AUTOMATED COUNT: 13.7 % (ref 11.6–14.5)
HCT VFR BLD AUTO: 40.8 % (ref 35–45)
HGB BLD-MCNC: 13.2 G/DL (ref 12–16)
LYMPHOCYTES # BLD: 2.1 K/UL (ref 0.9–3.6)
LYMPHOCYTES NFR BLD: 18 % (ref 21–52)
MCH RBC QN AUTO: 27.7 PG (ref 24–34)
MCHC RBC AUTO-ENTMCNC: 32.4 G/DL (ref 31–37)
MCV RBC AUTO: 85.7 FL (ref 74–97)
MONOCYTES # BLD: 0.9 K/UL (ref 0.05–1.2)
MONOCYTES NFR BLD: 7 % (ref 3–10)
NEUTS SEG # BLD: 8.6 K/UL (ref 1.8–8)
NEUTS SEG NFR BLD: 73 % (ref 40–73)
PLATELET # BLD AUTO: 417 K/UL (ref 135–420)
PMV BLD AUTO: 10.5 FL (ref 9.2–11.8)
RBC # BLD AUTO: 4.76 M/UL (ref 4.2–5.3)
URATE SERPL-MCNC: 6 MG/DL (ref 2.6–7.2)
WBC # BLD AUTO: 11.9 K/UL (ref 4.6–13.2)

## 2019-06-10 PROCEDURE — 84550 ASSAY OF BLOOD/URIC ACID: CPT

## 2019-06-10 PROCEDURE — 36415 COLL VENOUS BLD VENIPUNCTURE: CPT

## 2019-06-10 PROCEDURE — 85025 COMPLETE CBC W/AUTO DIFF WBC: CPT

## 2019-06-10 RX ORDER — LISINOPRIL 40 MG/1
40 TABLET ORAL DAILY
COMMUNITY
End: 2020-03-25

## 2019-06-10 RX ORDER — PREDNISONE 10 MG/1
TABLET ORAL
Qty: 20 TAB | Refills: 0 | Status: SHIPPED | OUTPATIENT
Start: 2019-06-10 | End: 2019-06-17

## 2019-06-10 NOTE — PROGRESS NOTES
Donnell Philippe is a 60 y.o. female presents to office for right swollen foot    Medication list has been reviewed with Donnell Philippe and updated as of today's date     Health Maintenance items with a due date reviewed with patient:  Health Maintenance Due   Topic Date Due    EYE EXAM RETINAL OR DILATED  05/19/1969    DTaP/Tdap/Td series (1 - Tdap) 05/19/1980    BREAST CANCER SCRN MAMMOGRAM  09/07/2018

## 2019-06-10 NOTE — PROGRESS NOTES
SUBJECTIVE:  Hanna Sneed is a 60y.o. year old female   Chief Complaint   Patient presents with    Foot Swelling     right       History of Present Illness: The patient went to the ED on 6/2/2019 for right foot pain and swelling. She said she may have had the foot got while exiting a anderson at a restaurant 5 days prior. She also felt that she may have hurt that foot again while getting out of the car. Then on the day of the visit to the ED she felt more significant pain with some swelling around the big toe. In the ED she was given prescription for Indocin which she took only occasional.  Her foot is not significantly better. She has history of elevated uric acid; but never had gout. She denies any malaise, fever, chills, nausea or vomiting. She is now taking her blood pressure medications regularly. In regards to diabetes, she has not been checking her blood sugar. She is taking Zantac for GERD. And it is controlled. She has been seeing neurology for her MS regularly. No Systemic, Cardiovascular or Respiratory symptoms. Past Medical History:   Diagnosis Date    Cephalgia     Hyperlipemia     mild    Hypertension     IFG (impaired fasting glucose)     MS (multiple sclerosis) (HCC)     Optic neuritis     Tendonitis of shoulder      Past Surgical History:   Procedure Laterality Date    HX HYSTERECTOMY      fibroids and endometriosis        Current Outpatient Medications   Medication Sig    lisinopril (PRINIVIL, ZESTRIL) 40 mg tablet Take 40 mg by mouth daily.  ergocalciferol (ERGOCALCIFEROL) 50,000 unit capsule Take 50,000 Units by mouth every seven (7) days.  hydroCHLOROthiazide (HYDRODIURIL) 12.5 mg tablet Take 1 Tab by mouth daily.  metoprolol succinate (TOPROL-XL) 100 mg tablet TAKE 1 TABLET BY MOUTH DAILY    metFORMIN (GLUCOPHAGE) 500 mg tablet Take 1 Tab by mouth two (2) times daily (with meals).     albuterol (PROVENTIL HFA, VENTOLIN HFA, PROAIR HFA) 90 mcg/actuation inhaler Take 2 Puffs by inhalation four (4) times daily as needed for Wheezing.  glucose blood VI test strips (BLOOD GLUCOSE TEST) strip Re: DM .00. Check once daily.  raNITIdine (ZANTAC) 150 mg tablet Take 1 Tab by mouth two (2) times a day.  Blood-Glucose Meter monitoring kit Re: DM .00. Check once daily.  Lancets misc Re: DM .00. Check once daily. No current facility-administered medications for this visit. SNo Known Allergies     Family History   Problem Relation Age of Onset    Cancer Mother         breast    Diabetes Father     Hypertension Father     Heart Disease Father     Diabetes Sister     Diabetes Brother     Hypertension Sister         Social History     Tobacco Use    Smoking status: Never Smoker    Smokeless tobacco: Never Used   Substance Use Topics    Alcohol use: No     Alcohol/week: 0.0 oz       Review of Systems:   Constitutional: No fever, chills, night sweats, malaise, dizziness. Cardiovascular: No angina, palpitations, PND, orthopnea, lightheadedness, edema, claudication. Respiratory: No dyspnea, wheeze, pleurisy, hemoptysis, unusual cough or sputum. Gastrointestinal: No nausea/ vomiting, bowel habit change, pain, melena, hematochezia, anorexia. Neurological: No seizures, speech abnormality, incontinence. Psychiatric: No agitation, confusion/disorientation, suicidal or homicidal ideation. Musculoskeletal[de-identified] denies any other complaints. Endocrine: Denies any other complaints. OBJECTIVE:  Physical Exam:   Constitutional: General Appearance:  well developed, execissively obese, nontoxic, in no acute distress. Visit Vitals  /82 (BP 1 Location: Right arm, BP Patient Position: Sitting)   Pulse 72   Temp 97.8 °F (36.6 °C) (Oral)   Resp 17   Ht 5' 8\" (1.727 m)   Wt 269 lb (122 kg)   SpO2 99%   BMI 40.90 kg/m²     Pulmonary: Respiratory effort: normal; no dyspnea, no retractions, no accessory muscle use. Auscultation: normal & symmetrical air exchange; no rales, no rhonchi, no wheeze; no rubs    Cardiovascular: Palpation: PMI not displaced or enlarged, no thrills or heaves. Auscultation: RRR; no murmur, rubs or gallops. Extremities: no edema, no active varicosity. N nithya: carotid arteries- normal volume & without bruit; no JVD. Gastrointestinal: Normal bowel sounds. No masses; no tenderness; no rebound/rigidity; no CVA tenderness. No hepatosplenomegaly. Psychiatric: Oriented to time, place and person. Neurologic: CN I to XII: intact. DTR: symmetrical & normal.  No focal weakness. Musculoskeletal:   NC/AT. Neck-supple. Right ankle: Nontender, normal range of motion, no effusion, no redness, no swelling. Right foot: There is mild swelling, moderate to severe tenderness, mild erythema, mild heat around the MP joint of the great toes. Lab Results   Component Value Date/Time    Uric acid 7.2 (H) 03/27/2014 10:10 AM     Lab Results   Component Value Date/Time    TSH 2.10 03/13/2019 11:32 AM       Lab Results   Component Value Date/Time    Hemoglobin A1c 7.6 (H) 03/13/2019 11:32 AM    Hemoglobin A1c (POC) 6.3 10/10/2017 09:01 AM     Lab Results   Component Value Date/Time    Sodium 137 03/13/2019 11:32 AM    Potassium 4.4 03/13/2019 11:32 AM    Chloride 103 03/13/2019 11:32 AM    CO2 26 03/13/2019 11:32 AM    Anion gap 8 03/13/2019 11:32 AM    Glucose 149 (H) 03/13/2019 11:32 AM    BUN 16 03/13/2019 11:32 AM    Creatinine 0.69 03/13/2019 11:32 AM    BUN/Creatinine ratio 23 (H) 03/13/2019 11:32 AM    GFR est AA >60 03/13/2019 11:32 AM    GFR est non-AA >60 03/13/2019 11:32 AM    Calcium 8.7 03/13/2019 11:32 AM    Bilirubin, total 0.4 03/13/2019 11:32 AM    AST (SGOT) 14 (L) 03/13/2019 11:32 AM    Alk.  phosphatase 95 03/13/2019 11:32 AM    Protein, total 7.6 03/13/2019 11:32 AM    Albumin 3.2 (L) 03/13/2019 11:32 AM    Globulin 4.4 (H) 03/13/2019 11:32 AM    A-G Ratio 0.7 (L) 03/13/2019 11:32 AM ALT (SGPT) 28 03/13/2019 11:32 AM     Lab Results   Component Value Date/Time    WBC 12.7 03/13/2019 11:32 AM    HGB 13.0 03/13/2019 11:32 AM    HCT 40.5 03/13/2019 11:32 AM    PLATELET 282 92/65/5240 11:32 AM    MCV 87.1 03/13/2019 11:32 AM     Lab Results   Component Value Date/Time    Vitamin D 25-Hydroxy 14.9 (L) 05/03/2018 08:19 AM      Rt foot Xray 6/2/19:  Soft tissue swelling along the dorsal aspect of the midfoot. No acute bony abnormality. ASSESSMENT:     1. Acute gout involving toe of right foot, unspecified cause      PLAN:     Pharmacologic Management: Medications reviewed with the patient. We will start with prednisone 40 mg and tapering down over 10 days. Advised effect on diabetes, hypertension and GERD control. Orders Placed This Encounter    CBC WITH AUTOMATED DIFF    URIC ACID    lisinopril (PRINIVIL, ZESTRIL) 40 mg tablet    predniSONE (DELTASONE) 10 mg tablet     Discussed DDx, follow-up & work-up. Discussed risk/benefit & side effect of treatment. Follow up visit as planned, prn sooner. Low salt low purine ADA diet, weightloss & graduated excecise. Check glucose regularly carefully especially since on prednisone. Health risk from non adherence discussed again. Patient voiced understanding. Follow-up and Dispositions    · Return in about 1 week (around 6/17/2019).            Rodney Mcclure MD

## 2019-06-11 NOTE — PROGRESS NOTES
White count is a little high; but stable like it has been for last 3 years. Otherwise CBC was good. Uric acid is better, in range now. However, a person can still get gout at this level. So please continue with the low purine diet as advised. Please follow-up as advised.

## 2019-06-17 ENCOUNTER — HOSPITAL ENCOUNTER (OUTPATIENT)
Dept: LAB | Age: 59
Discharge: HOME | End: 2019-06-17
Payer: COMMERCIAL

## 2019-06-17 ENCOUNTER — OFFICE VISIT (OUTPATIENT)
Dept: FAMILY MEDICINE CLINIC | Age: 59
End: 2019-06-17

## 2019-06-17 VITALS
OXYGEN SATURATION: 98 % | HEART RATE: 60 BPM | TEMPERATURE: 97.3 F | HEIGHT: 68 IN | RESPIRATION RATE: 18 BRPM | DIASTOLIC BLOOD PRESSURE: 80 MMHG | WEIGHT: 269.8 LBS | SYSTOLIC BLOOD PRESSURE: 132 MMHG | BODY MASS INDEX: 40.89 KG/M2

## 2019-06-17 DIAGNOSIS — M10.9 ACUTE GOUT INVOLVING TOE OF RIGHT FOOT, UNSPECIFIED CAUSE: Primary | ICD-10-CM

## 2019-06-17 DIAGNOSIS — E11.9 TYPE 2 DIABETES MELLITUS WITHOUT COMPLICATION, WITHOUT LONG-TERM CURRENT USE OF INSULIN (HCC): ICD-10-CM

## 2019-06-17 DIAGNOSIS — I10 ESSENTIAL HYPERTENSION: ICD-10-CM

## 2019-06-17 LAB
ALBUMIN SERPL-MCNC: 3.3 G/DL (ref 3.4–5)
ALBUMIN/GLOB SERPL: 0.8 {RATIO} (ref 0.8–1.7)
ALP SERPL-CCNC: 90 U/L (ref 45–117)
ALT SERPL-CCNC: 28 U/L (ref 13–56)
AST SERPL-CCNC: 11 U/L (ref 15–37)
BILIRUB DIRECT SERPL-MCNC: 0.1 MG/DL (ref 0–0.2)
BILIRUB SERPL-MCNC: 0.4 MG/DL (ref 0.2–1)
EST. AVERAGE GLUCOSE BLD GHB EST-MCNC: 186 MG/DL
GLOBULIN SER CALC-MCNC: 4.2 G/DL (ref 2–4)
GLUCOSE SERPL-MCNC: 174 MG/DL (ref 74–99)
HBA1C MFR BLD: 8.1 % (ref 4.2–5.6)
PROT SERPL-MCNC: 7.5 G/DL (ref 6.4–8.2)

## 2019-06-17 PROCEDURE — 36415 COLL VENOUS BLD VENIPUNCTURE: CPT

## 2019-06-17 PROCEDURE — 82947 ASSAY GLUCOSE BLOOD QUANT: CPT

## 2019-06-17 PROCEDURE — 83036 HEMOGLOBIN GLYCOSYLATED A1C: CPT

## 2019-06-17 PROCEDURE — 80076 HEPATIC FUNCTION PANEL: CPT

## 2019-06-17 RX ORDER — PREDNISONE 10 MG/1
TABLET ORAL
Qty: 7 TAB | Refills: 0 | Status: SHIPPED | OUTPATIENT
Start: 2019-06-17 | End: 2019-07-18

## 2019-06-17 NOTE — PROGRESS NOTES
Waldemar Iverson is a 60 y.o. female presents in office for f/u. Health Maintenance Due   Topic Date Due    EYE EXAM RETINAL OR DILATED  05/19/1969    DTaP/Tdap/Td series (1 - Tdap) 05/19/1980    BREAST CANCER SCRN MAMMOGRAM  09/07/2018       1. Have you been to the ER, urgent care clinic since your last visit? Hospitalized since your last visit? No    2. Have you seen or consulted any other health care providers outside of the [address] [place] [address] since your last visit? Include any pap smears or colon screening.  No

## 2019-06-17 NOTE — PROGRESS NOTES
SUBJECTIVE:  Gurdeep Alfonso is a 60y.o. year old female   Chief Complaint   Patient presents with    Gout     f/u    Diabetes     f/u    Hypertension     f/u    GERD     f/u       History of Present Illness: The patient went to the ED on 6/2/2019 for right foot pain and swelling. In the ED she was given prescription for Indocin which she took only occasionally. When she was seen here he week ago her foot was not significantly better. She has history of elevated uric acid. Last week she was treated with tapering dose of prednisone. With that her foot pain has subsided and swelling is improving. She denies any side effects from the prednisone. She denies any malaise, fever, chills, nausea or vomiting. She is now taking her blood pressure medications regularly. In regards to diabetes, she still has not been checking her blood sugar. She also has not been taking her metformin regularly. She is taking Zantac for GERD. And it is controlled. She has been seeing neurology for her MS regularly. No Systemic, Cardiovascular or Respiratory symptoms. Past Medical History:   Diagnosis Date    Cephalgia     Hyperlipemia     mild    Hypertension     IFG (impaired fasting glucose)     MS (multiple sclerosis) (HCC)     Optic neuritis     Tendonitis of shoulder      Past Surgical History:   Procedure Laterality Date    HX HYSTERECTOMY      fibroids and endometriosis        Current Outpatient Medications   Medication Sig    lisinopril (PRINIVIL, ZESTRIL) 40 mg tablet Take 40 mg by mouth daily.  predniSONE (DELTASONE) 10 mg tablet Take 4 tabs by mouth x 1 day; 3 tabs x 2 days; 2 tabs x 3 days; then 1 tab daily.  raNITIdine (ZANTAC) 150 mg tablet Take 1 Tab by mouth two (2) times a day.  ergocalciferol (ERGOCALCIFEROL) 50,000 unit capsule Take 50,000 Units by mouth every seven (7) days.  hydroCHLOROthiazide (HYDRODIURIL) 12.5 mg tablet Take 1 Tab by mouth daily.     metoprolol succinate (TOPROL-XL) 100 mg tablet TAKE 1 TABLET BY MOUTH DAILY    metFORMIN (GLUCOPHAGE) 500 mg tablet Take 1 Tab by mouth two (2) times daily (with meals).  Blood-Glucose Meter monitoring kit Re: DM .00. Check once daily.  glucose blood VI test strips (BLOOD GLUCOSE TEST) strip Re: DM .00. Check once daily.  Lancets misc Re: DM .00. Check once daily.  albuterol (PROVENTIL HFA, VENTOLIN HFA, PROAIR HFA) 90 mcg/actuation inhaler Take 2 Puffs by inhalation four (4) times daily as needed for Wheezing. No current facility-administered medications for this visit. SNo Known Allergies     Family History   Problem Relation Age of Onset    Cancer Mother         breast    Diabetes Father     Hypertension Father     Heart Disease Father     Diabetes Sister     Diabetes Brother     Hypertension Sister         Social History     Tobacco Use    Smoking status: Never Smoker    Smokeless tobacco: Never Used   Substance Use Topics    Alcohol use: No     Alcohol/week: 0.0 oz       Review of Systems:   Constitutional: No fever, chills, night sweats, malaise, dizziness. Cardiovascular: No angina, palpitations, PND, orthopnea, lightheadedness, edema, claudication. Respiratory: No dyspnea, wheeze, pleurisy, hemoptysis, unusual cough or sputum. Gastrointestinal: No nausea/ vomiting, bowel habit change, pain, melena, hematochezia, anorexia. Neurological: No seizures, speech abnormality, incontinence. Psychiatric: No agitation, confusion/disorientation, suicidal or homicidal ideation. Musculoskeletal[de-identified] denies any other complaints. Endocrine: Denies any other complaints. OBJECTIVE:  Physical Exam:   Constitutional: General Appearance:  well developed, execissively obese, nontoxic, in no acute distress.    Visit Vitals  /80 (BP 1 Location: Left arm, BP Patient Position: Sitting)   Pulse 60   Temp 97.3 °F (36.3 °C) (Oral)   Resp 18   Ht 5' 8\" (1.727 m)   Wt 269 lb 12.8 oz (122.4 kg)   SpO2 98%   BMI 41.02 kg/m²     Pulmonary: Respiratory effort: normal; no dyspnea, no retractions, no accessory muscle use. Auscultation: normal & symmetrical air exchange; no rales, no rhonchi, no wheeze; no rubs    Cardiovascular: Palpation: PMI not displaced or enlarged, no thrills or heaves. Auscultation: RRR; no murmur, rubs or gallops. Extremities: no edema, no active varicosity. N nithya: carotid arteries- normal volume & without bruit; no JVD. Gastrointestinal: Normal bowel sounds. No masses; no tenderness; no rebound/rigidity; no CVA tenderness. No hepatosplenomegaly. Psychiatric: Oriented to time, place and person. Neurologic: CN I to XII: intact. DTR: symmetrical & normal.  No focal weakness. Musculoskeletal:   NC/AT. Neck-supple. Right foot: There is mild swelling. There is minimal tenderness of the first MP joint, which is significantly better. There is no erythema, heat or effusion.       Lab Results   Component Value Date/Time    Cholesterol, total 152 03/13/2019 11:32 AM    HDL Cholesterol 42 03/13/2019 11:32 AM    LDL, calculated 86.8 03/13/2019 11:32 AM    VLDL, calculated 23.2 03/13/2019 11:32 AM    Triglyceride 116 03/13/2019 11:32 AM    CHOL/HDL Ratio 3.6 03/13/2019 11:32 AM       Lab Results   Component Value Date/Time    Uric acid 6.0 06/10/2019 10:19 AM     Lab Results   Component Value Date/Time    TSH 2.10 03/13/2019 11:32 AM       Lab Results   Component Value Date/Time    Hemoglobin A1c 7.6 (H) 03/13/2019 11:32 AM    Hemoglobin A1c (POC) 6.3 10/10/2017 09:01 AM     Lab Results   Component Value Date/Time    Sodium 137 03/13/2019 11:32 AM    Potassium 4.4 03/13/2019 11:32 AM    Chloride 103 03/13/2019 11:32 AM    CO2 26 03/13/2019 11:32 AM    Anion gap 8 03/13/2019 11:32 AM    Glucose 149 (H) 03/13/2019 11:32 AM    BUN 16 03/13/2019 11:32 AM    Creatinine 0.69 03/13/2019 11:32 AM    BUN/Creatinine ratio 23 (H) 03/13/2019 11:32 AM    GFR est AA >60 03/13/2019 11:32 AM    GFR est non-AA >60 03/13/2019 11:32 AM    Calcium 8.7 03/13/2019 11:32 AM    Bilirubin, total 0.4 03/13/2019 11:32 AM    AST (SGOT) 14 (L) 03/13/2019 11:32 AM    Alk. phosphatase 95 03/13/2019 11:32 AM    Protein, total 7.6 03/13/2019 11:32 AM    Albumin 3.2 (L) 03/13/2019 11:32 AM    Globulin 4.4 (H) 03/13/2019 11:32 AM    A-G Ratio 0.7 (L) 03/13/2019 11:32 AM    ALT (SGPT) 28 03/13/2019 11:32 AM     Lab Results   Component Value Date/Time    WBC 11.9 06/10/2019 10:19 AM    HGB 13.2 06/10/2019 10:19 AM    HCT 40.8 06/10/2019 10:19 AM    PLATELET 152 14/56/2639 10:19 AM    MCV 85.7 06/10/2019 10:19 AM     Lab Results   Component Value Date/Time    Vitamin D 25-Hydroxy 14.9 (L) 05/03/2018 08:19 AM      Rt foot Xray 6/2/19:  Soft tissue swelling along the dorsal aspect of the midfoot. No acute bony abnormality. ASSESSMENT:     1. Acute gout involving toe of right foot, unspecified cause    2. Type 2 diabetes mellitus without complication, without long-term current use of insulin ([address]    3. Essential hypertension      PLAN:     Pharmacologic Management: Medications reviewed with the patient. We will consider continuing with prednisone 10 mg daily for 7 more days. Patient is advised to fully. Advised again effect on diabetes, hypertension and GERD control. Patient is advised again importance of taking the antidiabetic medications regularly, especially when she is taking prednisone. Orders Placed This Encounter    HEMOGLOBIN A1C WITH EAG    GLUCOSE, RANDOM    HEPATIC FUNCTION PANEL    predniSONE (DELTASONE) 10 mg tablet     Discussed DDx, follow-up & work-up. Discussed risk/benefit & side effect of treatment. Follow up visit as planned, prn sooner. Low salt low purine ADA diet, weightloss & graduated excecise. Check glucose regularly carefully especially since on prednisone. Health risk from non adherence discussed again. Patient voiced understanding. Follow-up and Dispositions    · Return in about 1 month (around 7/17/2019).          Prabhu Singh MD

## 2019-06-18 RX ORDER — RANITIDINE 150 MG/1
150 TABLET, FILM COATED ORAL 2 TIMES DAILY
Qty: 180 TAB | Refills: 1 | Status: SHIPPED | OUTPATIENT
Start: 2019-06-18 | End: 2019-07-18

## 2019-06-18 NOTE — TELEPHONE ENCOUNTER
Pharmacy faxed a refill request for,  Requested Prescriptions     Pending Prescriptions Disp Refills    raNITIdine (ZANTAC) 150 mg tablet 60 Tab 1     Sig: Take 1 Tab by mouth two (2) times a day.      Last office visit 6/17/19    Please advise

## 2019-06-19 NOTE — PROGRESS NOTES
Liver profile was stable. Random glucose was high at 174. Hemoglobin A1c was much higher from 6.82 years ago to 8.1. Patient is advised to follow diet and exercise as tolerated, and take medications regularly. We will follow-up.

## 2019-06-24 ENCOUNTER — TELEPHONE (OUTPATIENT)
Dept: FAMILY MEDICINE CLINIC | Age: 59
End: 2019-06-24

## 2019-06-24 NOTE — TELEPHONE ENCOUNTER
Pt called and made aware of the message, verbalized understanding. Pt wants to know if she can stop taking Prednisone. Please advise.  Thank you  Tc Gipson LPN

## 2019-06-24 NOTE — TELEPHONE ENCOUNTER
Attempted to contact patient but no answer, message left on VM. Will try again later.  Thank you  Letty Kumar LPN

## 2019-06-24 NOTE — TELEPHONE ENCOUNTER
----- Message from Myke Nicholas MD sent at 6/19/2019  3:08 PM EDT -----  Liver profile was stable. Random glucose was high at 174. Hemoglobin A1c was much higher from 6.82 years ago to 8.1. Patient is advised to follow diet and exercise as tolerated, and take medications regularly. We will follow-up.

## 2019-06-28 NOTE — TELEPHONE ENCOUNTER
Pt calling to request medication   be sent to [id number]- [address] Allayne Kristy Pts last appt was 6/17/19, next appt sched for 7/15/19. Requested Prescriptions     Pending Prescriptions Disp Refills    hydroCHLOROthiazide (HYDRODIURIL) 12.5 mg tablet 90 Tab 0     Sig: Take 1 Tab by mouth daily.

## 2019-06-29 RX ORDER — HYDROCHLOROTHIAZIDE 12.5 MG/1
12.5 TABLET ORAL DAILY
Qty: 90 TAB | Refills: 3 | Status: SHIPPED | OUTPATIENT
Start: 2019-06-29

## 2019-07-18 ENCOUNTER — HOSPITAL ENCOUNTER (OUTPATIENT)
Dept: LAB | Age: 59
Discharge: HOME | End: 2019-07-18
Payer: COMMERCIAL

## 2019-07-18 ENCOUNTER — OFFICE VISIT (OUTPATIENT)
Dept: FAMILY MEDICINE CLINIC | Age: 59
End: 2019-07-18

## 2019-07-18 VITALS
SYSTOLIC BLOOD PRESSURE: 122 MMHG | RESPIRATION RATE: 17 BRPM | TEMPERATURE: 97.7 F | DIASTOLIC BLOOD PRESSURE: 70 MMHG | HEIGHT: 68 IN | OXYGEN SATURATION: 96 % | WEIGHT: 270.2 LBS | HEART RATE: 70 BPM | BODY MASS INDEX: 40.95 KG/M2

## 2019-07-18 DIAGNOSIS — I10 ESSENTIAL HYPERTENSION: Primary | ICD-10-CM

## 2019-07-18 DIAGNOSIS — I10 ESSENTIAL HYPERTENSION: ICD-10-CM

## 2019-07-18 DIAGNOSIS — K21.9 GASTROESOPHAGEAL REFLUX DISEASE WITHOUT ESOPHAGITIS: ICD-10-CM

## 2019-07-18 DIAGNOSIS — E11.9 TYPE 2 DIABETES MELLITUS WITHOUT COMPLICATION, WITHOUT LONG-TERM CURRENT USE OF INSULIN (HCC): ICD-10-CM

## 2019-07-18 DIAGNOSIS — M10.9 ACUTE GOUT INVOLVING TOE OF RIGHT FOOT, UNSPECIFIED CAUSE: ICD-10-CM

## 2019-07-18 LAB
ANION GAP SERPL CALC-SCNC: 7 MMOL/L (ref 3–18)
BUN SERPL-MCNC: 17 MG/DL (ref 7–18)
BUN/CREAT SERPL: 22 (ref 12–20)
CALCIUM SERPL-MCNC: 8.9 MG/DL (ref 8.5–10.1)
CHLORIDE SERPL-SCNC: 101 MMOL/L (ref 100–111)
CO2 SERPL-SCNC: 29 MMOL/L (ref 21–32)
CREAT SERPL-MCNC: 0.77 MG/DL (ref 0.6–1.3)
GLUCOSE SERPL-MCNC: 188 MG/DL (ref 74–99)
POTASSIUM SERPL-SCNC: 4.2 MMOL/L (ref 3.5–5.5)
SODIUM SERPL-SCNC: 137 MMOL/L (ref 136–145)

## 2019-07-18 PROCEDURE — 36415 COLL VENOUS BLD VENIPUNCTURE: CPT

## 2019-07-18 PROCEDURE — 80048 BASIC METABOLIC PNL TOTAL CA: CPT

## 2019-07-18 NOTE — PROGRESS NOTES
SUBJECTIVE:  Angelina Perez is a 60y.o. year old female   Chief Complaint   Patient presents with    Diabetes    Hypertension    Gout       History of Present Illness:       She is taking her blood pressure medications regularly and her BP is in range     In regards to diabetes, she has not been checking her blood sugar. She has not been taking metformin until 10 days ago. She is still not following lifestyle modification for diabetes and hypertension and hyperlipidemia. She declines medication for lipids. The regurgitation of food and burping is better and she is no longer taking Zantac. She has been referred to gastroenterology. The arthritis in her right foot have resolved and has not returned. She has been seeing neurology regularly. She is started back on her vitamin D from neurologist about a month ago. No Systemic, Cardiovascular or Respiratory symptoms. Past Medical History:   Diagnosis Date    Cephalgia     Hyperlipemia     mild    Hypertension     IFG (impaired fasting glucose)     MS (multiple sclerosis) (HCC)     Optic neuritis     Tendonitis of shoulder      Past Surgical History:   Procedure Laterality Date    HX HYSTERECTOMY      fibroids and endometriosis        Current Outpatient Medications   Medication Sig    ocrelizumab (OCREVUS) 30 mg/mL soln injection by IntraVENous route every 6 months.  hydroCHLOROthiazide (HYDRODIURIL) 12.5 mg tablet Take 1 Tab by mouth daily.  lisinopril (PRINIVIL, ZESTRIL) 40 mg tablet Take 40 mg by mouth daily.  ergocalciferol (ERGOCALCIFEROL) 50,000 unit capsule Take 50,000 Units by mouth every seven (7) days.  metoprolol succinate (TOPROL-XL) 100 mg tablet TAKE 1 TABLET BY MOUTH DAILY    metFORMIN (GLUCOPHAGE) 500 mg tablet Take 1 Tab by mouth two (2) times daily (with meals).  Blood-Glucose Meter monitoring kit Re: DM .00. Check once daily.     glucose blood VI test strips (BLOOD GLUCOSE TEST) strip Re: DM .00. Check once daily.  Lancets misc Re: DM .00. Check once daily. No current facility-administered medications for this visit. SNo Known Allergies     Family History   Problem Relation Age of Onset    Cancer Mother         breast    Diabetes Father     Hypertension Father     Heart Disease Father     Diabetes Sister     Diabetes Brother     Hypertension Sister         Social History     Tobacco Use    Smoking status: Never Smoker    Smokeless tobacco: Never Used   Substance Use Topics    Alcohol use: No     Alcohol/week: 0.0 standard drinks       Review of Systems:   Constitutional: No fever, chills, night sweats, malaise, dizziness. Cardiovascular: No angina, palpitations, PND, orthopnea, lightheadedness, edema, claudication. Respiratory: No dyspnea, wheeze, pleurisy, hemoptysis, unusual cough or sputum. Gastrointestinal: Chronic burping and occasional regurgitation as above. No nausea/ vomiting, bowel habit change, pain, melena, hematochezia, anorexia. Neurological: No seizures, speech abnormality, incontinence. Psychiatric: No agitation, confusion/disorientation, suicidal or homicidal ideation. Musculoskeletal[de-identified] denies any other complaints. Endocrine: Denies any other complaints. OBJECTIVE:  Physical Exam:   Constitutional: General Appearance:  well developed, execissively obese, nontoxic, in no acute distress. Visit Vitals  /70 (BP 1 Location: Right arm, BP Patient Position: Sitting)   Pulse 70   Temp 97.7 °F (36.5 °C) (Oral)   Resp 17   Ht 5' 8\" (1.727 m)   Wt 270 lb 3.2 oz (122.6 kg)   SpO2 96%   BMI 41.08 kg/m²     Pulmonary: Respiratory effort: normal; no dyspnea, no retractions, no accessory muscle use. Auscultation: normal & symmetrical air exchange; no rales, no rhonchi, no wheeze; no rubs    Cardiovascular: Palpation: PMI not displaced or enlarged, no thrills or heaves. Auscultation: RRR; no murmur, rubs or gallops. Extremities: no edema, no active varicosity. N nithya: carotid arteries- normal volume & without bruit; no JVD. Gastrointestinal: Normal bowel sounds. No masses; no tenderness; no rebound/rigidity; no CVA tenderness. No hepatosplenomegaly. Psychiatric: Oriented to time, place and person. Normal mood, no agitation or anxiety. Normal affect. Pleasant and cooperative. Neurologic: CN I to XII: intact. DTR: symmetrical & normal.  No focal weakness. Musculoskeletal:   NC/AT. Neck-supple. Right foot: The swelling, tenderness of the first MP joint have been resolved. There is no erythema, heat or effusion. Lab Results   Component Value Date/Time    TSH 2.10 03/13/2019 11:32 AM     Lab Results   Component Value Date/Time    Cholesterol, total 152 03/13/2019 11:32 AM    HDL Cholesterol 42 03/13/2019 11:32 AM    LDL, calculated 86.8 03/13/2019 11:32 AM    VLDL, calculated 23.2 03/13/2019 11:32 AM    Triglyceride 116 03/13/2019 11:32 AM    CHOL/HDL Ratio 3.6 03/13/2019 11:32 AM     Lab Results   Component Value Date/Time    Hemoglobin A1c 8.1 (H) 06/17/2019 09:12 AM    Hemoglobin A1c (POC) 6.3 10/10/2017 09:01 AM     Lab Results   Component Value Date/Time    Sodium 137 03/13/2019 11:32 AM    Potassium 4.4 03/13/2019 11:32 AM    Chloride 103 03/13/2019 11:32 AM    CO2 26 03/13/2019 11:32 AM    Anion gap 8 03/13/2019 11:32 AM    Glucose 174 (H) 06/17/2019 09:12 AM    BUN 16 03/13/2019 11:32 AM    Creatinine 0.69 03/13/2019 11:32 AM    BUN/Creatinine ratio 23 (H) 03/13/2019 11:32 AM    GFR est AA >60 03/13/2019 11:32 AM    GFR est non-AA >60 03/13/2019 11:32 AM    Calcium 8.7 03/13/2019 11:32 AM    Bilirubin, total 0.4 06/17/2019 09:12 AM    AST (SGOT) 11 (L) 06/17/2019 09:12 AM    Alk.  phosphatase 90 06/17/2019 09:12 AM    Protein, total 7.5 06/17/2019 09:12 AM    Albumin 3.3 (L) 06/17/2019 09:12 AM    Globulin 4.2 (H) 06/17/2019 09:12 AM    A-G Ratio 0.8 06/17/2019 09:12 AM    ALT (SGPT) 28 06/17/2019 09:12 AM     Lab Results   Component Value Date/Time    WBC 11.9 06/10/2019 10:19 AM    HGB 13.2 06/10/2019 10:19 AM    HCT 40.8 06/10/2019 10:19 AM    PLATELET 200 59/49/5705 10:19 AM    MCV 85.7 06/10/2019 10:19 AM     Lab Results   Component Value Date/Time    Vitamin D 25-Hydroxy 14.9 (L) 05/03/2018 08:19 AM       Lab work reviewed with the patient. ASSESSMENT:     1. Essential hypertension    2. Type 2 diabetes mellitus without complication, without long-term current use of insulin ([facility] [address]    3. Acute gout involving toe of right foot, unspecified cause    4. Gastroesophageal reflux disease without esophagitis      PLAN:     Pharmacologic Management: Medications reviewed with the patient. Take medications regularly. She declines a Statin. Orders Placed This Encounter    ocrelizumab (OCREVUS) 30 mg/mL soln injection     HM: she was given Rx for Tdap. She will make sure with her pharmacy that she has received it. She also had her mammogram.  She is advised to bring records of eye exam.    Follow-up with gastroenterologist.  Low purine diet. Also advised that weight loss will help with her health issues. Discussed DDx, follow-up & work-up. Discussed risk/benefit & side effect of treatment. Follow up visit as planned, prn sooner. Low salt ADA diet, weightloss & graduated excecise. Check glucose regularly as advised. Health risk from non adherence discussed again. Patient voiced understanding. Follow-up and Dispositions    · Return in about 3 months (around 10/18/2019).            Alessandra Byrd MD

## 2019-07-19 NOTE — PROGRESS NOTES
Kidney function is stable and in the range. However her potassium was quite high. Patient is advised to follow diet and exercise as tolerated.

## 2019-08-01 ENCOUNTER — TELEPHONE (OUTPATIENT)
Dept: FAMILY MEDICINE CLINIC | Age: 59
End: 2019-08-01

## 2019-08-01 DIAGNOSIS — Z12.31 SCREENING MAMMOGRAM, ENCOUNTER FOR: ICD-10-CM

## 2019-08-01 NOTE — TELEPHONE ENCOUNTER
----- Message from Ruby Carbone MD sent at 7/19/2019  9:08 AM EDT -----  Kidney function is stable and in the range. However her potassium was quite high. Patient is advised to follow diet and exercise as tolerated.

## 2019-08-02 ENCOUNTER — TELEPHONE (OUTPATIENT)
Dept: FAMILY MEDICINE CLINIC | Age: 59
End: 2019-08-02

## 2019-08-02 NOTE — TELEPHONE ENCOUNTER
Pt returned call.  --------------------------------------------------------    Flower Ortiz at 08/01/19 [id number]         [email or web address] for pt to call our office to review lab results    . Flower Ortiz at 08/01/19 [id number]         ----- Message from Monica Echols MD sent at 7/19/2019  9:08 AM EDT -----  Kidney function is stable and in the range. However her potassium was quite high. Patient is advised to follow diet and exercise as tolerated.

## 2019-10-09 ENCOUNTER — OFFICE VISIT (OUTPATIENT)
Dept: FAMILY MEDICINE CLINIC | Age: 59
End: 2019-10-09

## 2019-10-09 VITALS
TEMPERATURE: 97.6 F | WEIGHT: 268 LBS | DIASTOLIC BLOOD PRESSURE: 78 MMHG | HEIGHT: 68 IN | RESPIRATION RATE: 18 BRPM | SYSTOLIC BLOOD PRESSURE: 130 MMHG | HEART RATE: 77 BPM | OXYGEN SATURATION: 98 % | BODY MASS INDEX: 40.62 KG/M2

## 2019-10-09 DIAGNOSIS — E11.9 TYPE 2 DIABETES MELLITUS WITHOUT COMPLICATION, WITHOUT LONG-TERM CURRENT USE OF INSULIN (HCC): ICD-10-CM

## 2019-10-09 DIAGNOSIS — L30.9 DERMATITIS: Primary | ICD-10-CM

## 2019-10-09 RX ORDER — TRIAMCINOLONE ACETONIDE 1 MG/G
OINTMENT TOPICAL 2 TIMES DAILY
Qty: 30 G | Refills: 0 | Status: SHIPPED | OUTPATIENT
Start: 2019-10-09 | End: 2020-04-20

## 2019-10-09 NOTE — PROGRESS NOTES
Barbie Ibarra is a 60 y.o. female presents in office for a follow up for skin problems on left leg. Medication list has been reviewed with patient during office visit today. Health maintenance with due date reviewed with patient. Health Maintenance Due   Topic Date Due    EYE EXAM RETINAL OR DILATED  05/19/1969    DTaP/Tdap/Td series (1 - Tdap) 05/19/1980    Influenza Age 5 to Adult  08/01/2019       1. Have you been to the ER, urgent care clinic since your last visit? Hospitalized since your last visit? No    2. Have you seen or consulted any other health care providers outside of the [address] [place] [address] since your last visit? Include any pap smears or colon screening.  No

## 2019-10-09 NOTE — PROGRESS NOTES
SUBJECTIVE:  Candice Hernandez is a 60y.o. year old female   Chief Complaint   Patient presents with    Skin Problem     1 week       History of Present Illness:       She has a mildly pruritic dry rash behind the right leg starting about a month ago but it has gotten more noticable to her in last the week. She is not on any new medication, no new cosmetics, sprays, detergent, food or clothing. She is taking her blood pressure medications regularly and her BP is in range. She is taking metformin for diabetes and she remembers. She is not on any medication for lipids    She has been seeing neurology regularly. Her MS medication has not changed. She is started back on her vitamin D from neurologist about 3 months ago. No Systemic, Cardiovascular or Respiratory symptoms. Past Medical History:   Diagnosis Date    Cephalgia     Hyperlipemia     mild    Hypertension     IFG (impaired fasting glucose)     MS (multiple sclerosis) (HCC)     Optic neuritis     Tendonitis of shoulder      Past Surgical History:   Procedure Laterality Date    HX HYSTERECTOMY      fibroids and endometriosis        Current Outpatient Medications   Medication Sig    ocrelizumab (OCREVUS) 30 mg/mL soln injection by IntraVENous route every 6 months.  hydroCHLOROthiazide (HYDRODIURIL) 12.5 mg tablet Take 1 Tab by mouth daily.  lisinopril (PRINIVIL, ZESTRIL) 40 mg tablet Take 40 mg by mouth daily.  ergocalciferol (ERGOCALCIFEROL) 50,000 unit capsule Take 50,000 Units by mouth every seven (7) days.  metoprolol succinate (TOPROL-XL) 100 mg tablet TAKE 1 TABLET BY MOUTH DAILY    metFORMIN (GLUCOPHAGE) 500 mg tablet Take 1 Tab by mouth two (2) times daily (with meals).  Blood-Glucose Meter monitoring kit Re: DM .00. Check once daily.  glucose blood VI test strips (BLOOD GLUCOSE TEST) strip Re: DM .00. Check once daily.  Lancets misc Re: DM .00. Check once daily.      No current facility-administered medications for this visit. SNo Known Allergies     Family History   Problem Relation Age of Onset    Cancer Mother         breast    Diabetes Father     Hypertension Father     Heart Disease Father     Diabetes Sister     Diabetes Brother     Hypertension Sister         Social History     Tobacco Use    Smoking status: Never Smoker    Smokeless tobacco: Never Used   Substance Use Topics    Alcohol use: No     Alcohol/week: 0.0 standard drinks       Review of Systems:   Constitutional: No fever, chills, night sweats, malaise, dizziness. Cardiovascular: No angina, palpitations, PND, orthopnea, lightheadedness, edema, claudication. Respiratory: No dyspnea, wheeze, pleurisy, hemoptysis, unusual cough or sputum. Gastrointestinal: Chronic burping and occasional regurgitation as above. No nausea/ vomiting, bowel habit change, pain, melena, hematochezia, anorexia. Neurological: No seizures, speech abnormality, incontinence. Psychiatric: No agitation, confusion/disorientation, suicidal or homicidal ideation. Skin: Rash in leg as above denies any other complaints. Endocrine: Denies any other complaints. OBJECTIVE:  Physical Exam:   Constitutional: General Appearance:  well developed, execissively obese, nontoxic, in no acute distress. Visit Vitals  /78 (BP 1 Location: Left arm, BP Patient Position: Sitting)   Pulse 77   Temp 97.6 °F (36.4 °C) (Oral)   Resp 18   Ht 5' 8\" (1.727 m)   Wt 268 lb (121.6 kg)   SpO2 98%   BMI 40.75 kg/m²     Pulmonary: Respiratory effort: normal; no dyspnea, no retractions, no accessory muscle use. Auscultation: normal & symmetrical air exchange; no rales, no rhonchi, no wheeze; no rubs    Cardiovascular: Palpation: PMI not displaced or enlarged, no thrills or heaves. Auscultation: RRR; no murmur, rubs or gallops. Extremities: no edema, no active varicosity.  N nithya: carotid arteries- normal volume & without bruit; no JVD.     Gastrointestinal: Normal bowel sounds. No masses; no tenderness; no rebound/rigidity; no CVA tenderness. No hepatosplenomegaly. Psychiatric: Oriented to time, place and person. Musculoskeletal:   NC/AT. Neck-supple. Skin: Rt mid leg has a squamous rash with mild lichenification. No induration, redness, heat or tenderness. Lab Results   Component Value Date/Time    TSH 2.10 03/13/2019 11:32 AM     Lab Results   Component Value Date/Time    Cholesterol, total 152 03/13/2019 11:32 AM    HDL Cholesterol 42 03/13/2019 11:32 AM    LDL, calculated 86.8 03/13/2019 11:32 AM    VLDL, calculated 23.2 03/13/2019 11:32 AM    Triglyceride 116 03/13/2019 11:32 AM    CHOL/HDL Ratio 3.6 03/13/2019 11:32 AM     Lab Results   Component Value Date/Time    Hemoglobin A1c 8.1 (H) 06/17/2019 09:12 AM    Hemoglobin A1c (POC) 6.3 10/10/2017 09:01 AM     Lab Results   Component Value Date/Time    Sodium 137 07/18/2019 09:08 AM    Potassium 4.2 07/18/2019 09:08 AM    Chloride 101 07/18/2019 09:08 AM    CO2 29 07/18/2019 09:08 AM    Anion gap 7 07/18/2019 09:08 AM    Glucose 188 (H) 07/18/2019 09:08 AM    BUN 17 07/18/2019 09:08 AM    Creatinine 0.77 07/18/2019 09:08 AM    BUN/Creatinine ratio 22 (H) 07/18/2019 09:08 AM    GFR est AA >60 07/18/2019 09:08 AM    GFR est non-AA >60 07/18/2019 09:08 AM    Calcium 8.9 07/18/2019 09:08 AM    Bilirubin, total 0.4 06/17/2019 09:12 AM    AST (SGOT) 11 (L) 06/17/2019 09:12 AM    Alk.  phosphatase 90 06/17/2019 09:12 AM    Protein, total 7.5 06/17/2019 09:12 AM    Albumin 3.3 (L) 06/17/2019 09:12 AM    Globulin 4.2 (H) 06/17/2019 09:12 AM    A-G Ratio 0.8 06/17/2019 09:12 AM    ALT (SGPT) 28 06/17/2019 09:12 AM     Lab Results   Component Value Date/Time    WBC 11.9 06/10/2019 10:19 AM    HGB 13.2 06/10/2019 10:19 AM    HCT 40.8 06/10/2019 10:19 AM    PLATELET 216 58/63/6840 10:19 AM    MCV 85.7 06/10/2019 10:19 AM     Lab Results   Component Value Date/Time    Vitamin D 25-Hydroxy 14.9 (L) 05/03/2018 08:19 AM       Lab work reviewed with the patient. ASSESSMENT:     1. Dermatitis      PLAN:     Pharmacologic Management: Medications reviewed with the patient. Take medications regularly. Triamcinolone ointment 0.1% twice daily on the rash. Orders Placed This Encounter    triamcinolone acetonide (KENALOG) 0.1 % ointment     Discussed DDx, follow-up & work-up. Discussed risk/benefit & side effect of treatment. Follow up visit as planned for next week, prn sooner. Low salt ADA diet, weightloss & graduated excecise. Check glucose regularly as advised. Health risk from non adherence discussed again. Patient voiced understanding.            Heena Hobson MD

## 2019-10-17 ENCOUNTER — HOSPITAL ENCOUNTER (OUTPATIENT)
Dept: LAB | Age: 59
Discharge: HOME | End: 2019-10-17
Payer: COMMERCIAL

## 2019-10-17 ENCOUNTER — OFFICE VISIT (OUTPATIENT)
Dept: FAMILY MEDICINE CLINIC | Age: 59
End: 2019-10-17

## 2019-10-17 VITALS
BODY MASS INDEX: 41.07 KG/M2 | DIASTOLIC BLOOD PRESSURE: 80 MMHG | SYSTOLIC BLOOD PRESSURE: 138 MMHG | TEMPERATURE: 97.2 F | WEIGHT: 271 LBS | RESPIRATION RATE: 16 BRPM | HEART RATE: 72 BPM | OXYGEN SATURATION: 98 % | HEIGHT: 68 IN

## 2019-10-17 DIAGNOSIS — E11.9 TYPE 2 DIABETES MELLITUS WITHOUT COMPLICATION, WITHOUT LONG-TERM CURRENT USE OF INSULIN (HCC): ICD-10-CM

## 2019-10-17 DIAGNOSIS — I10 ESSENTIAL HYPERTENSION: ICD-10-CM

## 2019-10-17 DIAGNOSIS — E11.9 TYPE 2 DIABETES MELLITUS WITHOUT COMPLICATION, WITHOUT LONG-TERM CURRENT USE OF INSULIN (HCC): Primary | ICD-10-CM

## 2019-10-17 DIAGNOSIS — Z23 ENCOUNTER FOR IMMUNIZATION: ICD-10-CM

## 2019-10-17 DIAGNOSIS — L30.9 DERMATITIS: ICD-10-CM

## 2019-10-17 DIAGNOSIS — E78.5 HYPERLIPIDEMIA, UNSPECIFIED HYPERLIPIDEMIA TYPE: ICD-10-CM

## 2019-10-17 DIAGNOSIS — G35 MULTIPLE SCLEROSIS (HCC): ICD-10-CM

## 2019-10-17 LAB
EST. AVERAGE GLUCOSE BLD GHB EST-MCNC: 183 MG/DL
HBA1C MFR BLD: 8 % (ref 4.2–5.6)

## 2019-10-17 PROCEDURE — 83036 HEMOGLOBIN GLYCOSYLATED A1C: CPT

## 2019-10-17 PROCEDURE — 36415 COLL VENOUS BLD VENIPUNCTURE: CPT

## 2019-10-17 NOTE — PROGRESS NOTES
Cong Woods is a 60 y.o. female (: 1960) presenting to address:    Chief Complaint   Patient presents with    Skin Problem     f/u    Diabetes     f/u    Cholesterol Problem     f/u    Hypertension     f/u     Medication list has been reviewed with patient during office visit today. Health maintenance with due date reviewed with patient. Health Maintenance Due   Topic Date Due    EYE EXAM RETINAL OR DILATED  1969    DTaP/Tdap/Td series (1 - Tdap) 1980    Influenza Age 5 to Adult  2019    PAP AKA CERVICAL CYTOLOGY  02/10/2020       There were no vitals filed for this visit. Hearing/Vision:   No exam data present    Learning Assessment:     Learning Assessment 10/17/2019   PRIMARY LEARNER Patient   PRIMARY LANGUAGE ENGLISH   LEARNER PREFERENCE PRIMARY READING     LISTENING     VIDEOS   ANSWERED BY patient   RELATIONSHIP SELF     Depression Screening:     3 most recent PHQ Screens 10/17/2019   Little interest or pleasure in doing things Not at all   Feeling down, depressed, irritable, or hopeless Not at all   Total Score PHQ 2 0     Fall Risk Assessment:     Fall Risk Assessment, last 12 mths 3/13/2019   Able to walk? Yes   Fall in past 12 months? No     Abuse Screening:     Abuse Screening Questionnaire 3/13/2019   Do you ever feel afraid of your partner? N   Are you in a relationship with someone who physically or mentally threatens you? N   Is it safe for you to go home? Y     Coordination of Care Questionaire:   1. Have you been to the ER, urgent care clinic since your last visit? Hospitalized since your last visit? No    2. Have you seen or consulted any other health care providers outside of the [address] [place] [address] since your last visit? Include any pap smears or colon screening.  No

## 2019-10-17 NOTE — PROGRESS NOTES
SUBJECTIVE:  Lucila Carbajal is a 60y.o. year old female   Chief Complaint   Patient presents with    Skin Problem     f/u    Diabetes     f/u    Cholesterol Problem     f/u    Hypertension     f/u       History of Present Illness:       She is taking her blood pressure medications regularly and her BP is in reasonable control. In regards to diabetes, she has not been checking her blood sugar. She has not been taking metformin regularly. She is still not following lifestyle modification for diabetes and hypertension and hyperlipidemia. She declines medication for lipids. The rash in her leg is better. She denies any side effect of the TCA ointment. The regurgitation of food and burping have not returned and she is no longer taking Zantac. She has been seeing neurology regularly for MS. She is on her vitamin D from neurologist.  However she has not been taking it regularly. No Systemic, Cardiovascular or Respiratory symptoms. Past Medical History:   Diagnosis Date    Cephalgia     Hyperlipemia     mild    Hypertension     IFG (impaired fasting glucose)     MS (multiple sclerosis) (HCC)     Optic neuritis     Tendonitis of shoulder      Past Surgical History:   Procedure Laterality Date    HX HYSTERECTOMY      fibroids and endometriosis        Current Outpatient Medications   Medication Sig    triamcinolone acetonide (KENALOG) 0.1 % ointment Apply  to affected area two (2) times a day. use thin layer    ocrelizumab (OCREVUS) 30 mg/mL soln injection by IntraVENous route every 6 months.  lisinopril (PRINIVIL, ZESTRIL) 40 mg tablet Take 40 mg by mouth daily.  ergocalciferol (ERGOCALCIFEROL) 50,000 unit capsule Take 50,000 Units by mouth every seven (7) days.  metoprolol succinate (TOPROL-XL) 100 mg tablet TAKE 1 TABLET BY MOUTH DAILY    metFORMIN (GLUCOPHAGE) 500 mg tablet Take 1 Tab by mouth two (2) times daily (with meals).     Blood-Glucose Meter monitoring kit Re: DM .00. Check once daily.  glucose blood VI test strips (BLOOD GLUCOSE TEST) strip Re: DM .00. Check once daily.  Lancets misc Re: DM .00. Check once daily.  hydroCHLOROthiazide (HYDRODIURIL) 12.5 mg tablet Take 1 Tab by mouth daily. No current facility-administered medications for this visit. SNo Known Allergies     Family History   Problem Relation Age of Onset    Cancer Mother         breast    Diabetes Father     Hypertension Father     Heart Disease Father     Diabetes Sister     Diabetes Brother     Hypertension Sister         Social History     Tobacco Use    Smoking status: Never Smoker    Smokeless tobacco: Never Used   Substance Use Topics    Alcohol use: No     Alcohol/week: 0.0 standard drinks       Review of Systems:   Constitutional: No fever, chills, night sweats, malaise, dizziness. Cardiovascular: No angina, palpitations, PND, orthopnea, lightheadedness, edema, claudication. Respiratory: No dyspnea, wheeze, pleurisy, hemoptysis, unusual cough or sputum. Gastrointestinal: Chronic burping and occasional regurgitation as above. No nausea/ vomiting, bowel habit change, pain, melena, hematochezia, anorexia. Neurological: No seizures, speech abnormality, incontinence. Psychiatric: No agitation, confusion/disorientation, suicidal or homicidal ideation. Musculoskeletal[de-identified] denies any other complaints. Endocrine: Denies any other complaints. Skin: rash is getting better. OBJECTIVE:  Physical Exam:     Constitutional: General Appearance:  well developed, execissively obese, nontoxic, in no acute distress. Visit Vitals  /80 (BP 1 Location: Left arm, BP Patient Position: Sitting)   Pulse 72   Temp 97.2 °F (36.2 °C) (Oral)   Resp 16   Ht 5' 8\" (1.727 m)   Wt 271 lb (122.9 kg)   SpO2 98%   BMI 41.21 kg/m²     Pulmonary: Respiratory effort: normal; no dyspnea, no retractions, no accessory muscle use.    Auscultation: normal & symmetrical air exchange; no rales, no rhonchi, no wheeze; no rubs    Cardiovascular: Palpation: PMI not displaced or enlarged, no thrills or heaves. Auscultation: RRR; no murmur, rubs or gallops. Extremities: no edema, no active varicosity. N nithya: carotid arteries- normal volume & without bruit; no JVD. Gastrointestinal: Normal bowel sounds. No masses; no tenderness; no rebound/rigidity; no CVA tenderness. No hepatosplenomegaly. Psychiatric: Oriented to time, place and person. Normal mood, no agitation or anxiety. Normal affect. Pleasant and cooperative. Neurologic: CN I to XII: intact. DTR: symmetrical & normal.  No focal weakness. Musculoskeletal:   NC/AT. Neck-supple. Right foot: The swelling, tenderness of the first MP joint have been resolved. There is no erythema, heat or effusion. Skin: Rt mid leg has a squamous rash is improving; mild lichenification unchanged. No induration, redness, heat or tenderness.     Lab Results   Component Value Date/Time    TSH 2.10 03/13/2019 11:32 AM     Lab Results   Component Value Date/Time    Cholesterol, total 152 03/13/2019 11:32 AM    HDL Cholesterol 42 03/13/2019 11:32 AM    LDL, calculated 86.8 03/13/2019 11:32 AM    VLDL, calculated 23.2 03/13/2019 11:32 AM    Triglyceride 116 03/13/2019 11:32 AM    CHOL/HDL Ratio 3.6 03/13/2019 11:32 AM     Lab Results   Component Value Date/Time    Hemoglobin A1c 8.1 (H) 06/17/2019 09:12 AM    Hemoglobin A1c (POC) 6.3 10/10/2017 09:01 AM     Lab Results   Component Value Date/Time    Sodium 137 07/18/2019 09:08 AM    Potassium 4.2 07/18/2019 09:08 AM    Chloride 101 07/18/2019 09:08 AM    CO2 29 07/18/2019 09:08 AM    Anion gap 7 07/18/2019 09:08 AM    Glucose 188 (H) 07/18/2019 09:08 AM    BUN 17 07/18/2019 09:08 AM    Creatinine 0.77 07/18/2019 09:08 AM    BUN/Creatinine ratio 22 (H) 07/18/2019 09:08 AM    GFR est AA >60 07/18/2019 09:08 AM    GFR est non-AA >60 07/18/2019 09:08 AM    Calcium 8.9 07/18/2019 09:08 AM    Bilirubin, total 0.4 06/17/2019 09:12 AM    AST (SGOT) 11 (L) 06/17/2019 09:12 AM    Alk. phosphatase 90 06/17/2019 09:12 AM    Protein, total 7.5 06/17/2019 09:12 AM    Albumin 3.3 (L) 06/17/2019 09:12 AM    Globulin 4.2 (H) 06/17/2019 09:12 AM    A-G Ratio 0.8 06/17/2019 09:12 AM    ALT (SGPT) 28 06/17/2019 09:12 AM     Lab Results   Component Value Date/Time    WBC 11.9 06/10/2019 10:19 AM    HGB 13.2 06/10/2019 10:19 AM    HCT 40.8 06/10/2019 10:19 AM    PLATELET 706 47/36/8942 10:19 AM    MCV 85.7 06/10/2019 10:19 AM     Lab Results   Component Value Date/Time    Vitamin D 25-Hydroxy 14.9 (L) 05/03/2018 08:19 AM       Lab work reviewed with the patient. ASSESSMENT:     1. Type 2 diabetes mellitus without complication, without long-term current use of insulin ([facility] [address]    2. Dermatitis    3. Essential hypertension    4. Hyperlipidemia, unspecified hyperlipidemia type    5. Multiple sclerosis ([facility]    6. Encounter for immunization      PLAN:     Pharmacologic Management: Medications reviewed with the patient. Take medications regularly. She declines a Statin. Orders Placed This Encounter    INFLUENZA VIRUS VACCINE QUADRIVALENT, PRESERVATIVE FREE SYRINGE (26796)    HEMOGLOBIN A1C WITH EAG    diphtheria-pertussis, acellular,-tetanus (BOOSTRIX TDAP) 2.5-8-5 Lf-mcg-Lf/0.5mL susp susp     HM: she was given Rx for Tdap. Before the shot, she will make sure with her pharmacy that she has not received it. She is advised to bring records of eye exam.    Weight loss will help with her health issues. Discussed DDx, follow-up & work-up. Discussed risk/benefit & side effect of treatment. Follow up visit as planned, prn sooner. Low salt ADA diet, weightloss & graduated excecise. Check glucose regularly as advised. Health risk from non adherence discussed again. Patient voiced understanding. Follow-up and Dispositions    · Return in about 3 months (around 1/17/2020).          Zulma Perez Hartwell Bamberger, MD

## 2019-10-18 NOTE — PROGRESS NOTES
Hemoglobin A1c is still quite high, quite higher than 2 years ago. Please take your medications regularly and follow diet and exercise as tolerated. Will follow.

## 2019-10-19 ENCOUNTER — TELEPHONE (OUTPATIENT)
Dept: FAMILY MEDICINE CLINIC | Age: 59
End: 2019-10-19

## 2019-10-19 NOTE — TELEPHONE ENCOUNTER
----- Message from Domenico Mccrary MD sent at 10/18/2019 12:57 PM EDT -----  Hemoglobin A1c is still quite high, quite higher than 2 years ago. Please take your medications regularly and follow diet and exercise as tolerated. Will follow.

## 2019-10-19 NOTE — TELEPHONE ENCOUNTER
First attempted to contact patient but no answer, a voice message was left requesting patient to call our office at [phone]. Will try again later if patient does not return call. Per PCP  Hemoglobin A1c is still quite high, quite higher than 2 years ago. Lee Frey take your medications regularly and follow diet and exercise as tolerated.  Will follow.

## 2019-10-22 NOTE — TELEPHONE ENCOUNTER
Spoke to patient about results. Patient acknowledge results given with additional instructions advise per doctor. Patient has no other concerns at this time. Hemoglobin A1c is still quite high, quite higher than 2 years ago. Farheen Calvert take your medications regularly and follow diet and exercise as tolerated.  Will follow.

## 2020-01-20 ENCOUNTER — HOSPITAL ENCOUNTER (OUTPATIENT)
Dept: LAB | Age: 60
Discharge: HOME | End: 2020-01-20
Payer: COMMERCIAL

## 2020-01-20 ENCOUNTER — OFFICE VISIT (OUTPATIENT)
Dept: FAMILY MEDICINE CLINIC | Facility: CLINIC | Age: 60
End: 2020-01-20

## 2020-01-20 VITALS
DIASTOLIC BLOOD PRESSURE: 77 MMHG | BODY MASS INDEX: 40.62 KG/M2 | RESPIRATION RATE: 17 BRPM | WEIGHT: 268 LBS | HEIGHT: 68 IN | TEMPERATURE: 96.9 F | OXYGEN SATURATION: 100 % | HEART RATE: 77 BPM | SYSTOLIC BLOOD PRESSURE: 136 MMHG

## 2020-01-20 DIAGNOSIS — I10 ESSENTIAL HYPERTENSION: ICD-10-CM

## 2020-01-20 DIAGNOSIS — E11.9 TYPE 2 DIABETES MELLITUS WITHOUT COMPLICATION, WITHOUT LONG-TERM CURRENT USE OF INSULIN (HCC): Primary | ICD-10-CM

## 2020-01-20 DIAGNOSIS — E11.9 TYPE 2 DIABETES MELLITUS WITHOUT COMPLICATION, WITHOUT LONG-TERM CURRENT USE OF INSULIN (HCC): ICD-10-CM

## 2020-01-20 DIAGNOSIS — E55.9 HYPOVITAMINOSIS D: ICD-10-CM

## 2020-01-20 DIAGNOSIS — E78.5 HYPERLIPIDEMIA, UNSPECIFIED HYPERLIPIDEMIA TYPE: ICD-10-CM

## 2020-01-20 LAB
25(OH)D3 SERPL-MCNC: 14.5 NG/ML (ref 30–100)
ALBUMIN SERPL-MCNC: 3.2 G/DL (ref 3.4–5)
ALBUMIN/GLOB SERPL: 0.7 {RATIO} (ref 0.8–1.7)
ALP SERPL-CCNC: 100 U/L (ref 45–117)
ALT SERPL-CCNC: 22 U/L (ref 13–56)
ANION GAP SERPL CALC-SCNC: 5 MMOL/L (ref 3–18)
AST SERPL-CCNC: 22 U/L (ref 10–38)
BILIRUB SERPL-MCNC: 0.5 MG/DL (ref 0.2–1)
BUN SERPL-MCNC: 18 MG/DL (ref 7–18)
BUN/CREAT SERPL: 23 (ref 12–20)
CALCIUM SERPL-MCNC: 9.2 MG/DL (ref 8.5–10.1)
CHLORIDE SERPL-SCNC: 103 MMOL/L (ref 100–111)
CHOLEST SERPL-MCNC: 164 MG/DL
CO2 SERPL-SCNC: 28 MMOL/L (ref 21–32)
CREAT SERPL-MCNC: 0.79 MG/DL (ref 0.6–1.3)
GLOBULIN SER CALC-MCNC: 4.6 G/DL (ref 2–4)
GLUCOSE SERPL-MCNC: 240 MG/DL (ref 74–99)
HDLC SERPL-MCNC: 38 MG/DL (ref 40–60)
HDLC SERPL: 4.3 {RATIO} (ref 0–5)
LDLC SERPL CALC-MCNC: 98.4 MG/DL (ref 0–100)
LIPID PROFILE,FLP: ABNORMAL
POTASSIUM SERPL-SCNC: 5 MMOL/L (ref 3.5–5.5)
PROT SERPL-MCNC: 7.8 G/DL (ref 6.4–8.2)
SODIUM SERPL-SCNC: 136 MMOL/L (ref 136–145)
TRIGL SERPL-MCNC: 138 MG/DL (ref ?–150)
TSH SERPL DL<=0.05 MIU/L-ACNC: 1.46 UIU/ML (ref 0.36–3.74)
VLDLC SERPL CALC-MCNC: 27.6 MG/DL

## 2020-01-20 PROCEDURE — 80061 LIPID PANEL: CPT

## 2020-01-20 PROCEDURE — 84443 ASSAY THYROID STIM HORMONE: CPT

## 2020-01-20 PROCEDURE — 80053 COMPREHEN METABOLIC PANEL: CPT

## 2020-01-20 PROCEDURE — 82306 VITAMIN D 25 HYDROXY: CPT

## 2020-01-20 PROCEDURE — 36415 COLL VENOUS BLD VENIPUNCTURE: CPT

## 2020-01-20 NOTE — PATIENT INSTRUCTIONS
Body Mass Index: Care Instructions Your Care Instructions Body mass index (BMI) can help you see if your weight is raising your risk for health problems. It uses a formula to compare how much you weigh with how tall you are. · A BMI lower than 18.5 is considered underweight. · A BMI between 18.5 and 24.9 is considered healthy. · A BMI between 25 and 29.9 is considered overweight. A BMI of 30 or higher is considered obese. If your BMI is in the normal range, it means that you have a lower risk for weight-related health problems. If your BMI is in the overweight or obese range, you may be at increased risk for weight-related health problems, such as high blood pressure, heart disease, stroke, arthritis or joint pain, and diabetes. If your BMI is in the underweight range, you may be at increased risk for health problems such as fatigue, lower protection (immunity) against illness, muscle loss, bone loss, hair loss, and hormone problems. BMI is just one measure of your risk for weight-related health problems. You may be at higher risk for health problems if you are not active, you eat an unhealthy diet, or you drink too much alcohol or use tobacco products. Follow-up care is a key part of your treatment and safety. Be sure to make and go to all appointments, and call your doctor if you are having problems. It's also a good idea to know your test results and keep a list of the medicines you take. How can you care for yourself at home? · Practice healthy eating habits. This includes eating plenty of fruits, vegetables, whole grains, lean protein, and low-fat dairy. · If your doctor recommends it, get more exercise. Walking is a good choice. Bit by bit, increase the amount you walk every day. Try for at least 30 minutes on most days of the week. · Do not smoke. Smoking can increase your risk for health problems.  If you need help quitting, talk to your doctor about stop-smoking programs and medicines. These can increase your chances of quitting for good. · Limit alcohol to 2 drinks a day for men and 1 drink a day for women. Too much alcohol can cause health problems. If you have a BMI higher than 25 · Your doctor may do other tests to check your risk for weight-related health problems. This may include measuring the distance around your waist. A waist measurement of more than 40 inches in men or 35 inches in women can increase the risk of weight-related health problems. · Talk with your doctor about steps you can take to stay healthy or improve your health. You may need to make lifestyle changes to lose weight and stay healthy, such as changing your diet and getting regular exercise. If you have a BMI lower than 18.5 · Your doctor may do other tests to check your risk for health problems. · Talk with your doctor about steps you can take to stay healthy or improve your health. You may need to make lifestyle changes to gain or maintain weight and stay healthy, such as getting more healthy foods in your diet and doing exercises to build muscle. Where can you learn more? Go to http://shaka-bimal.info/. Enter S176 in the search box to learn more about \"Body Mass Index: Care Instructions. \" Current as of: October 13, 2016 Content Version: 11.4 © 3260-4436 [employer]. Care instructions adapted under license by "[employer] (which disclaims liability or warranty for this information). If you have questions about a medical condition or this instruction, always ask your healthcare professional. [place] [address] any warranty or liability for your use of this information. Starting a Weight Loss Plan: Care Instructions Your Care Instructions If you are thinking about losing weight, it can be hard to know where to start.  Your doctor can help you set up a weight loss plan that best meets your needs. You may want to take a class on nutrition or exercise, or join a weight loss support group. If you have questions about how to make changes to your eating or exercise habits, ask your doctor about seeing a registered dietitian or an exercise specialist. 
It can be a big challenge to lose weight. But you do not have to make huge changes at once. Make small changes, and stick with them. When those changes become habit, add a few more changes. If you do not think you are ready to make changes right now, try to pick a date in the future. Make an appointment to see your doctor to discuss whether the time is right for you to start a plan. Follow-up care is a key part of your treatment and safety. Be sure to make and go to all appointments, and call your doctor if you are having problems. It's also a good idea to know your test results and keep a list of the medicines you take. How can you care for yourself at home? · Set realistic goals. Many people expect to lose much more weight than is likely. A weight loss of 5% to 10% of your body weight may be enough to improve your health. · Get family and friends involved to provide support. Talk to them about why you are trying to lose weight, and ask them to help. They can help by participating in exercise and having meals with you, even if they may be eating something different. · Find what works best for you. If you do not have time or do not like to cook, a program that offers meal replacement bars or shakes may be better for you. Or if you like to prepare meals, finding a plan that includes daily menus and recipes may be best. 
· Ask your doctor about other health professionals who can help you achieve your weight loss goals. ¨ A dietitian can help you make healthy changes in your diet.  
¨ An exercise specialist or  can help you develop a safe and effective exercise program. 
 ¨ A counselor or psychiatrist can help you cope with issues such as depression, anxiety, or family problems that can make it hard to focus on weight loss. · Consider joining a support group for people who are trying to lose weight. Your doctor can suggest groups in your area. Where can you learn more? Go to http://shaka-bimal.info/. Enter D903 in the search box to learn more about \"Starting a Weight Loss Plan: Care Instructions. \" Current as of: October 13, 2016 Content Version: 11.4 © 5418-7845 [employer]. Care instructions adapted under license by [employer] (which disclaims liability or warranty for this information). If you have questions about a medical condition or this instruction, always ask your healthcare professional. [address] any warranty or liability for your use of this information.

## 2020-01-20 NOTE — PROGRESS NOTES
SUBJECTIVE:  Celi Petersen is a 60y.o. year old female   Chief Complaint   Patient presents with    Diabetes    Hypertension    Cholesterol Problem       History of Present Illness:       She is taking her blood pressure medications regularly and her BP is in reasonable control. In regards to diabetes, she has not been checking her blood sugar. She has not been taking metformin regularly. She is still not following lifestyle modification for diabetes and hypertension and hyperlipidemia. She declines medication for lipids. She has been seeing neurology regularly for MS. She is on her vitamin D from neurologist.  However she has not been taking it regularly. No more gout attack. The rash in her leg is better. She denies any side effect of the TCA ointment. The regurgitation of food and burping have not returned and she is no longer taking Zantac. No Systemic, Cardiovascular or Respiratory symptoms. Past Medical History:   Diagnosis Date    Cephalgia     Hyperlipemia     mild    Hypertension     IFG (impaired fasting glucose)     MS (multiple sclerosis) (HCC)     Optic neuritis     Tendonitis of shoulder      Past Surgical History:   Procedure Laterality Date    HX HYSTERECTOMY      fibroids and endometriosis        Current Outpatient Medications   Medication Sig    triamcinolone acetonide (KENALOG) 0.1 % ointment Apply  to affected area two (2) times a day. use thin layer    ocrelizumab (OCREVUS) 30 mg/mL soln injection by IntraVENous route every 6 months.  hydroCHLOROthiazide (HYDRODIURIL) 12.5 mg tablet Take 1 Tab by mouth daily.  lisinopril (PRINIVIL, ZESTRIL) 40 mg tablet Take 40 mg by mouth daily.  ergocalciferol (ERGOCALCIFEROL) 50,000 unit capsule Take 50,000 Units by mouth every seven (7) days.     metoprolol succinate (TOPROL-XL) 100 mg tablet TAKE 1 TABLET BY MOUTH DAILY    metFORMIN (GLUCOPHAGE) 500 mg tablet Take 1 Tab by mouth two (2) times daily (with meals).  Blood-Glucose Meter monitoring kit Re: DM .00. Check once daily.  glucose blood VI test strips (BLOOD GLUCOSE TEST) strip Re: DM .00. Check once daily.  Lancets misc Re: DM .00. Check once daily. No current facility-administered medications for this visit. SNo Known Allergies     Family History   Problem Relation Age of Onset    Cancer Mother         breast    Diabetes Father     Hypertension Father     Heart Disease Father     Diabetes Sister     Diabetes Brother     Hypertension Sister         Social History     Tobacco Use    Smoking status: Never Smoker    Smokeless tobacco: Never Used   Substance Use Topics    Alcohol use: No     Alcohol/week: 0.0 standard drinks       Review of Systems:   Constitutional: No fever, chills, night sweats, malaise, dizziness. Cardiovascular: No angina, palpitations, PND, orthopnea, lightheadedness, edema, claudication. Respiratory: No dyspnea, wheeze, pleurisy, hemoptysis, unusual cough or sputum. Gastrointestinal: Chronic burping and occasional regurgitation as above. No nausea/ vomiting, bowel habit change, pain, melena, hematochezia, anorexia. Neurological: No seizures, speech abnormality, incontinence. Psychiatric: No agitation, confusion/disorientation, suicidal or homicidal ideation. Musculoskeletal[de-identified] denies any other complaints. Endocrine: Denies any other complaints. Skin: rash is getting better. OBJECTIVE:  Physical Exam:     Constitutional: General Appearance:  well developed, execissively obese, nontoxic, in no acute distress. Visit Vitals  /77   Pulse 77   Temp 96.9 °F (36.1 °C) (Oral)   Resp 17   Ht 5' 8\" (1.727 m)   Wt 268 lb (121.6 kg)   SpO2 100%   BMI 40.75 kg/m²     Pulmonary: Respiratory effort: normal; no dyspnea, no retractions, no accessory muscle use.    Auscultation: normal & symmetrical air exchange; no rales, no rhonchi, no wheeze; no rubs    Cardiovascular: Palpation: PMI not displaced or enlarged, no thrills or heaves. Auscultation: RRR; no murmur, rubs or gallops. Extremities: no edema, no active varicosity. N nithya: carotid arteries- normal volume & without bruit; no JVD. Gastrointestinal: Normal bowel sounds. No masses; no tenderness; no rebound/rigidity; no CVA tenderness. No hepatosplenomegaly. Psychiatric: Oriented to time, place and person. Normal mood, no agitation or anxiety. Normal affect. Pleasant and cooperative. Neurologic: CN I to XII: intact. DTR: symmetrical & normal.  No focal weakness. Musculoskeletal:   NC/AT. Neck-supple. Skin: Rt mid leg has a squamous rash is further improving. No induration, redness, heat or tenderness. Lab Results   Component Value Date/Time    TSH 2.10 03/13/2019 11:32 AM     Lab Results   Component Value Date/Time    Cholesterol, total 152 03/13/2019 11:32 AM    HDL Cholesterol 42 03/13/2019 11:32 AM    LDL, calculated 86.8 03/13/2019 11:32 AM    VLDL, calculated 23.2 03/13/2019 11:32 AM    Triglyceride 116 03/13/2019 11:32 AM    CHOL/HDL Ratio 3.6 03/13/2019 11:32 AM     Lab Results   Component Value Date/Time    Hemoglobin A1c 8.0 (H) 10/17/2019 09:21 AM    Hemoglobin A1c (POC) 6.3 10/10/2017 09:01 AM     Lab Results   Component Value Date/Time    Sodium 137 07/18/2019 09:08 AM    Potassium 4.2 07/18/2019 09:08 AM    Chloride 101 07/18/2019 09:08 AM    CO2 29 07/18/2019 09:08 AM    Anion gap 7 07/18/2019 09:08 AM    Glucose 188 (H) 07/18/2019 09:08 AM    BUN 17 07/18/2019 09:08 AM    Creatinine 0.77 07/18/2019 09:08 AM    BUN/Creatinine ratio 22 (H) 07/18/2019 09:08 AM    GFR est AA >60 07/18/2019 09:08 AM    GFR est non-AA >60 07/18/2019 09:08 AM    Calcium 8.9 07/18/2019 09:08 AM    Bilirubin, total 0.4 06/17/2019 09:12 AM    AST (SGOT) 11 (L) 06/17/2019 09:12 AM    Alk.  phosphatase 90 06/17/2019 09:12 AM    Protein, total 7.5 06/17/2019 09:12 AM    Albumin 3.3 (L) 06/17/2019 09:12 AM    Globulin 4.2 (H) 06/17/2019 09:12 AM    A-G Ratio 0.8 06/17/2019 09:12 AM    ALT (SGPT) 28 06/17/2019 09:12 AM     Lab Results   Component Value Date/Time    WBC 11.9 06/10/2019 10:19 AM    HGB 13.2 06/10/2019 10:19 AM    HCT 40.8 06/10/2019 10:19 AM    PLATELET 171 25/06/5124 10:19 AM    MCV 85.7 06/10/2019 10:19 AM     Lab Results   Component Value Date/Time    Vitamin D 25-Hydroxy 14.9 (L) 05/03/2018 08:19 AM         ASSESSMENT:     1. Type 2 diabetes mellitus without complication, without long-term current use of insulin ([facility] [address]    2. Essential hypertension    3. Hyperlipidemia, unspecified hyperlipidemia type    4. Hypovitaminosis D      PLAN:     Pharmacologic Management: Medications reviewed with the patient. Take medications regularly. She declines a Statin. Orders Placed This Encounter    CBC WITH AUTOMATED DIFF    METABOLIC PANEL, COMPREHENSIVE    HEMOGLOBIN A1C WITH EAG    LIPID PANEL    VITAMIN D, 25 HYDROXY    TSH 3RD GENERATION     HM: she has given Rx for Tdap. Before the shot, she will make sure with her pharmacy that she has not received it. She is advised to bring records of eye exam.    Discussed the patient's BMI with her. The BMI follow up plan is as follows:   -dietary management education, guidance, and counseling  -encourage exercise  -monitor weight prescribed dietary intake  -printed instructions in AVS.    Weight loss will help with her health issues. Discussed DDx, follow-up & work-up. Discussed risk/benefit & side effect of treatment. Follow up visit as planned, prn sooner. Low salt ADA diet, weightloss & graduated excecise. Check glucose regularly as advised. Health risk from non adherence discussed again. Patient voiced understanding. Follow-up and Dispositions    · Return in about 3 months (around 4/20/2020).            Deborah Singletary MD

## 2020-01-20 NOTE — PROGRESS NOTES
Chief Complaint   Patient presents with    Diabetes    Hypertension    Cholesterol Problem     1. Have you been to the ER, urgent care clinic since your last visit? Hospitalized since your last visit? No    2. Have you seen or consulted any other health care providers outside of the [address] [place] [address] since your last visit? Include any pap smears or colon screening.  No

## 2020-01-21 ENCOUNTER — OFFICE VISIT (OUTPATIENT)
Dept: FAMILY MEDICINE CLINIC | Facility: CLINIC | Age: 60
End: 2020-01-21

## 2020-01-21 ENCOUNTER — HOSPITAL ENCOUNTER (OUTPATIENT)
Dept: LAB | Age: 60
Discharge: HOME | End: 2020-01-21
Payer: COMMERCIAL

## 2020-01-21 VITALS
RESPIRATION RATE: 16 BRPM | OXYGEN SATURATION: 100 % | HEART RATE: 77 BPM | SYSTOLIC BLOOD PRESSURE: 127 MMHG | HEIGHT: 68 IN | TEMPERATURE: 97.1 F | DIASTOLIC BLOOD PRESSURE: 77 MMHG | BODY MASS INDEX: 40.8 KG/M2 | WEIGHT: 269.2 LBS

## 2020-01-21 DIAGNOSIS — I10 ESSENTIAL HYPERTENSION: ICD-10-CM

## 2020-01-21 DIAGNOSIS — M10.9 ACUTE GOUT INVOLVING TOE OF LEFT FOOT, UNSPECIFIED CAUSE: Primary | ICD-10-CM

## 2020-01-21 DIAGNOSIS — M19.079 ARTHRITIS OF BIG TOE: ICD-10-CM

## 2020-01-21 DIAGNOSIS — E11.9 TYPE 2 DIABETES MELLITUS WITHOUT COMPLICATION, WITHOUT LONG-TERM CURRENT USE OF INSULIN (HCC): ICD-10-CM

## 2020-01-21 LAB — URATE SERPL-MCNC: 6.3 MG/DL (ref 2.6–7.2)

## 2020-01-21 PROCEDURE — 36415 COLL VENOUS BLD VENIPUNCTURE: CPT

## 2020-01-21 PROCEDURE — 84550 ASSAY OF BLOOD/URIC ACID: CPT

## 2020-01-21 RX ORDER — PREDNISONE 10 MG/1
TABLET ORAL
Qty: 28 TAB | Refills: 0 | Status: SHIPPED | OUTPATIENT
Start: 2020-01-21 | End: 2020-04-20

## 2020-01-21 NOTE — PROGRESS NOTES
SUBJECTIVE:  Marika Bell is a 60y.o. year old female   Chief Complaint   Patient presents with    Toe Pain     left foot       History of Present Illness:       She woke up this morning with sudden onset of pain in the base of left great toe. She has had same problem on the right great toe several months ago and was treated for gout with prednisone which cleared her symptoms. She denies any systemic symptoms like nausea, vomiting, chills, fever or malaise. She had upper respiratory tract infection over a week ago and she has been drinking alcoholic beverage to \"fight it\". She is taking her blood pressure medications regularly and her BP is in control. In regards to diabetes, she has not been checking her blood sugar. She has not been taking metformin regularly. She is still not following lifestyle modification. .      She has been seeing neurology regularly for MS. She is on her vitamin D from neurologist.  However she has not been taking it regularly. No Systemic, Cardiovascular or Respiratory symptoms. Past Medical History:   Diagnosis Date    Cephalgia     Hyperlipemia     mild    Hypertension     IFG (impaired fasting glucose)     MS (multiple sclerosis) ([facility])     Optic neuritis     Tendonitis of shoulder      Past Surgical History:   Procedure Laterality Date    HX HYSTERECTOMY      fibroids and endometriosis        Current Outpatient Medications   Medication Sig    predniSONE (DELTASONE) 10 mg tablet 3 tab PO x 3 days, 2 tabs x 4 days, then 1 daily.  triamcinolone acetonide (KENALOG) 0.1 % ointment Apply  to affected area two (2) times a day. use thin layer    ocrelizumab (OCREVUS) 30 mg/mL soln injection by IntraVENous route every 6 months.  hydroCHLOROthiazide (HYDRODIURIL) 12.5 mg tablet Take 1 Tab by mouth daily.  lisinopril (PRINIVIL, ZESTRIL) 40 mg tablet Take 40 mg by mouth daily.     ergocalciferol (ERGOCALCIFEROL) 50,000 unit capsule Take 50,000 Units by mouth every seven (7) days.  metoprolol succinate (TOPROL-XL) 100 mg tablet TAKE 1 TABLET BY MOUTH DAILY    metFORMIN (GLUCOPHAGE) 500 mg tablet Take 1 Tab by mouth two (2) times daily (with meals).  Blood-Glucose Meter monitoring kit Re: DM .00. Check once daily.  glucose blood VI test strips (BLOOD GLUCOSE TEST) strip Re: DM .00. Check once daily.  Lancets misc Re: DM .00. Check once daily. No current facility-administered medications for this visit. SNo Known Allergies     Family History   Problem Relation Age of Onset    Cancer Mother         breast    Diabetes Father     Hypertension Father     Heart Disease Father     Diabetes Sister     Diabetes Brother     Hypertension Sister         Social History     Tobacco Use    Smoking status: Never Smoker    Smokeless tobacco: Never Used   Substance Use Topics    Alcohol use: No     Alcohol/week: 0.0 standard drinks       Review of Systems:   Constitutional: No fever, chills, night sweats, malaise, dizziness. Cardiovascular: No angina, palpitations, PND, orthopnea, lightheadedness, edema, claudication. Respiratory: No dyspnea, wheeze, pleurisy, hemoptysis, unusual cough or sputum. Gastrointestinal: Chronic burping and occasional regurgitation as above. No nausea/ vomiting, bowel habit change, pain, melena, hematochezia, anorexia. Neurological: No seizures, speech abnormality, incontinence. Psychiatric: No agitation, confusion/disorientation, suicidal or homicidal ideation. Musculoskeletal[de-identified] Gout symptoms of left great toe. Denies any other complaints. Endocrine: Diabetes, hyperlipidemia. Denies any other complaints. OBJECTIVE:  Physical Exam:     Constitutional: General Appearance:  well developed, execissively obese, nontoxic, in no acute distress.      Visit Vitals  /77 (BP 1 Location: Right arm, BP Patient Position: Sitting)   Pulse 77   Temp 97.1 °F (36.2 °C) (Oral)   Resp 16   Ht 5' 8\" (1.727 m)   Wt 269 lb 3.2 oz (122.1 kg)   SpO2 100%   BMI 40.93 kg/m²     Pulmonary: Respiratory effort: normal; no dyspnea, no retractions, no accessory muscle use. Psychiatric: Oriented to time, place and person. Normal mood, no agitation or anxiety. Normal affect. Pleasant and cooperative. Musculoskeletal:   NC/AT. Neck-supple. Left ankle: Nontender, normal range of motion, no effusion, no redness, no swelling. Left foot: There is mild swelling, moderate to severe tenderness, mild erythema, mild heat around the MP joint of the great toe. .    Lab Results   Component Value Date/Time    TSH 1.46 01/20/2020 09:03 AM     Lab Results   Component Value Date/Time    Cholesterol, total 164 01/20/2020 09:03 AM    HDL Cholesterol 38 (L) 01/20/2020 09:03 AM    LDL, calculated 98.4 01/20/2020 09:03 AM    VLDL, calculated 27.6 01/20/2020 09:03 AM    Triglyceride 138 01/20/2020 09:03 AM    CHOL/HDL Ratio 4.3 01/20/2020 09:03 AM     Lab Results   Component Value Date/Time    Hemoglobin A1c 8.0 (H) 10/17/2019 09:21 AM    Hemoglobin A1c (POC) 6.3 10/10/2017 09:01 AM     Lab Results   Component Value Date/Time    Sodium 136 01/20/2020 09:03 AM    Potassium 5.0 01/20/2020 09:03 AM    Chloride 103 01/20/2020 09:03 AM    CO2 28 01/20/2020 09:03 AM    Anion gap 5 01/20/2020 09:03 AM    Glucose 240 (H) 01/20/2020 09:03 AM    BUN 18 01/20/2020 09:03 AM    Creatinine 0.79 01/20/2020 09:03 AM    BUN/Creatinine ratio 23 (H) 01/20/2020 09:03 AM    GFR est AA >60 01/20/2020 09:03 AM    GFR est non-AA >60 01/20/2020 09:03 AM    Calcium 9.2 01/20/2020 09:03 AM    Bilirubin, total 0.5 01/20/2020 09:03 AM    AST (SGOT) 22 01/20/2020 09:03 AM    Alk.  phosphatase 100 01/20/2020 09:03 AM    Protein, total 7.8 01/20/2020 09:03 AM    Albumin 3.2 (L) 01/20/2020 09:03 AM    Globulin 4.6 (H) 01/20/2020 09:03 AM    A-G Ratio 0.7 (L) 01/20/2020 09:03 AM    ALT (SGPT) 22 01/20/2020 09:03 AM     Lab Results   Component Value Date/Time WBC 11.9 06/10/2019 10:19 AM    HGB 13.2 06/10/2019 10:19 AM    HCT 40.8 06/10/2019 10:19 AM    PLATELET 694 04/33/7465 10:19 AM    MCV 85.7 06/10/2019 10:19 AM     Lab Results   Component Value Date/Time    Vitamin D 25-Hydroxy 14.5 (L) 01/20/2020 09:03 AM         ASSESSMENT:     1. Acute gout involving toe of left foot, unspecified cause    2. Arthritis of big toe      PLAN:     Pharmacologic Management: Medications reviewed with the patient. Since patient has multiple medical conditions, will not use colchicine or Indocin. Patient did well in the past with prednisone, therefore will treat with prednisone starting at 30 mg daily and tapering to 10 mg daily. Discussed with patient in details. Patient is advised about interaction of prednisone with her biyearly Ocrevus injection. She needs to discuss that with her neurologist.    Orders Placed This Encounter    URIC ACID    predniSONE (DELTASONE) 10 mg tablet   Note: Patient was advised to have CBC and hemoglobin A1c drawn yesterday. It was not done and patient is advised again today to have them drawn. .  Discussed DDx, follow-up & work-up. Discussed risk/benefit & side effect of treatment. Follow up visit as planned, prn sooner. Low salt, low purine ADA diet, weightloss & graduated excecise. Check glucose regularly as advised and call as needed. Health risk from non adherence discussed again. Patient voiced understanding. Follow-up and Dispositions    · Return if symptoms worsen or fail to improve.                Kole Gaston MD

## 2020-01-22 NOTE — PROGRESS NOTES
Uric acid came back about the same. Please refrain from alcohol intake and stay on low purine diet. Yesterday's blood work shows glucose was quite high and vitamin D was quite low. Please take your metformin regularly and daily supplement regularly. Since you are on prednisone now, it is vital that did not follow diet and take metformin regularly. Also check your blood glucose regularly. CBC and hemoglobin A1c is still pending because today's sample was inadequate for testing. Please, provide new sample or let us know which lab you can go to as soon as possible. We will send the order to that lab.

## 2020-01-28 ENCOUNTER — HOSPITAL ENCOUNTER (OUTPATIENT)
Dept: LAB | Age: 60
Discharge: HOME | End: 2020-01-28
Payer: COMMERCIAL

## 2020-01-28 DIAGNOSIS — I10 ESSENTIAL HYPERTENSION: ICD-10-CM

## 2020-01-28 DIAGNOSIS — E11.9 TYPE 2 DIABETES MELLITUS WITHOUT COMPLICATION, WITHOUT LONG-TERM CURRENT USE OF INSULIN (HCC): ICD-10-CM

## 2020-01-28 DIAGNOSIS — E11.9 TYPE 2 DIABETES MELLITUS WITHOUT COMPLICATION, WITHOUT LONG-TERM CURRENT USE OF INSULIN (HCC): Primary | ICD-10-CM

## 2020-01-28 LAB
BASOPHILS # BLD: 0.1 K/UL (ref 0–0.1)
BASOPHILS NFR BLD: 0 % (ref 0–2)
DIFFERENTIAL METHOD BLD: ABNORMAL
EOSINOPHIL # BLD: 0.2 K/UL (ref 0–0.4)
EOSINOPHIL NFR BLD: 2 % (ref 0–5)
ERYTHROCYTE [DISTWIDTH] IN BLOOD BY AUTOMATED COUNT: 13.5 % (ref 11.6–14.5)
EST. AVERAGE GLUCOSE BLD GHB EST-MCNC: 192 MG/DL
HBA1C MFR BLD: 8.3 % (ref 4.2–5.6)
HCT VFR BLD AUTO: 39.2 % (ref 35–45)
HGB BLD-MCNC: 13 G/DL (ref 12–16)
LYMPHOCYTES # BLD: 3.6 K/UL (ref 0.9–3.6)
LYMPHOCYTES NFR BLD: 24 % (ref 21–52)
MCH RBC QN AUTO: 28.6 PG (ref 24–34)
MCHC RBC AUTO-ENTMCNC: 33.2 G/DL (ref 31–37)
MCV RBC AUTO: 86.2 FL (ref 74–97)
MONOCYTES # BLD: 1.1 K/UL (ref 0.05–1.2)
MONOCYTES NFR BLD: 7 % (ref 3–10)
NEUTS SEG # BLD: 10.1 K/UL (ref 1.8–8)
NEUTS SEG NFR BLD: 67 % (ref 40–73)
PLATELET # BLD AUTO: 413 K/UL (ref 135–420)
PMV BLD AUTO: 10.8 FL (ref 9.2–11.8)
RBC # BLD AUTO: 4.55 M/UL (ref 4.2–5.3)
WBC # BLD AUTO: 15.1 K/UL (ref 4.6–13.2)

## 2020-01-28 PROCEDURE — 85025 COMPLETE CBC W/AUTO DIFF WBC: CPT

## 2020-01-28 PROCEDURE — 36415 COLL VENOUS BLD VENIPUNCTURE: CPT

## 2020-01-28 PROCEDURE — 83036 HEMOGLOBIN GLYCOSYLATED A1C: CPT

## 2020-01-30 NOTE — PROGRESS NOTES
Hemoglobin A1c is little worse than 3 months ago. It was much better 2 years ago. This is likely because he has not been taking her medications regularly as she informed during her visits here. Patient is advised to take medications regularly and go back on diet and exercise as tolerated. CBC was good except for elevated white count as expected from prednisone, like in the past.  Patient needs to follow-up promptly if she has any sign of infection.

## 2020-03-25 RX ORDER — LISINOPRIL 40 MG/1
TABLET ORAL
Qty: 90 TAB | Refills: 1 | Status: SHIPPED | OUTPATIENT
Start: 2020-03-25 | End: 2020-09-20

## 2020-03-31 RX ORDER — METOPROLOL SUCCINATE 100 MG/1
TABLET, EXTENDED RELEASE ORAL
Qty: 90 TAB | Refills: 0 | Status: SHIPPED | OUTPATIENT
Start: 2020-03-31 | End: 2020-06-22

## 2020-04-20 ENCOUNTER — VIRTUAL VISIT (OUTPATIENT)
Dept: FAMILY MEDICINE CLINIC | Facility: CLINIC | Age: 60
End: 2020-04-20

## 2020-04-20 DIAGNOSIS — E55.9 HYPOVITAMINOSIS D: ICD-10-CM

## 2020-04-20 DIAGNOSIS — E11.9 TYPE 2 DIABETES MELLITUS WITHOUT COMPLICATION, WITHOUT LONG-TERM CURRENT USE OF INSULIN (HCC): Primary | ICD-10-CM

## 2020-04-20 DIAGNOSIS — E78.5 HYPERLIPIDEMIA, UNSPECIFIED HYPERLIPIDEMIA TYPE: ICD-10-CM

## 2020-04-20 DIAGNOSIS — G35 MULTIPLE SCLEROSIS (HCC): ICD-10-CM

## 2020-04-20 DIAGNOSIS — I10 ESSENTIAL HYPERTENSION: ICD-10-CM

## 2020-04-20 RX ORDER — ACETAMINOPHEN 500 MG
TABLET ORAL
Qty: 1 KIT | Refills: 0 | Status: SHIPPED | OUTPATIENT
Start: 2020-04-20 | End: 2021-06-21 | Stop reason: SDUPTHER

## 2020-04-20 NOTE — PROGRESS NOTES
Consent:   Eric Escamilla, who was seen by synchronous (real-time) audio-video technology, and/or her healthcare decision maker, is aware that this patient-initiated, Telehealth encounter on 4/20/2020 is a billable service, with coverage as determined by her insurance carrier. She is aware that she may receive a bill and has provided verbal consent to proceed: Yes. SUBJECTIVE:  Eric Escamilla is a 60y.o. year old female   Chief Complaint   Patient presents with    Hypertension    Diabetes    Cholesterol Problem       History of Present Illness:       She is taking her blood pressure medications regularly and her BP has been in reasonable control. In regards to diabetes, she has not been checking her blood sugar regularly. Also she has not been taking metformin regularly. She says that she is now following lifestyle modification for diabetes and hypertension and hyperlipidemia. She declines medication for lipids. She has been seeing neurology regularly for MS. She is on her vitamin D from neurologist.  However she has not been taking it regularly. She is now taking vitamin D supplement regularly. No more gout attack. The rash in her leg has not returned. The regurgitation of food and burping have not returned. No Systemic, Cardiovascular or Respiratory symptoms.     Past Medical History:   Diagnosis Date    Cephalgia     Diabetes ([address]     Hyperlipemia     mild    Hypertension     IFG (impaired fasting glucose)     MS (multiple sclerosis) ([facility])     Optic neuritis     Tendonitis of shoulder      Past Surgical History:   Procedure Laterality Date    HX HYSTERECTOMY      fibroids and endometriosis        Current Outpatient Medications   Medication Sig    metoprolol succinate (TOPROL-XL) 100 mg tablet TAKE 1 TABLET BY MOUTH DAILY    lisinopriL (PRINIVIL, ZESTRIL) 40 mg tablet TAKE 1 TABLET BY MOUTH EVERY DAY    ocrelizumab (OCREVUS) 30 mg/mL soln injection by IntraVENous route every 6 months.  hydroCHLOROthiazide (HYDRODIURIL) 12.5 mg tablet Take 1 Tab by mouth daily.  ergocalciferol (ERGOCALCIFEROL) 50,000 unit capsule Take 50,000 Units by mouth every seven (7) days.  metFORMIN (GLUCOPHAGE) 500 mg tablet Take 1 Tab by mouth two (2) times daily (with meals).  Blood-Glucose Meter monitoring kit Re: DM .00. Check once daily.  glucose blood VI test strips (BLOOD GLUCOSE TEST) strip Re: DM .00. Check once daily.  Lancets misc Re: DM .00. Check once daily. No current facility-administered medications for this visit. No Known Allergies     Family History   Problem Relation Age of Onset    Cancer Mother         breast    Diabetes Father     Hypertension Father     Heart Disease Father     Diabetes Sister     Diabetes Brother     Hypertension Sister         Social History     Tobacco Use    Smoking status: Never Smoker    Smokeless tobacco: Never Used   Substance Use Topics    Alcohol use: No     Alcohol/week: 0.0 standard drinks       Review of Systems:   Constitutional: No fever, chills, night sweats, malaise, dizziness. Cardiovascular: No angina, palpitations, PND, orthopnea, lightheadedness, edema, claudication. Respiratory: No dyspnea, wheeze, pleurisy, hemoptysis, unusual cough or sputum. Gastrointestinal: No burping and regurgitation. No nausea/ vomiting, bowel habit change, pain, melena, hematochezia, anorexia. Neurological: No seizures, speech abnormality, incontinence. Psychiatric: No agitation, confusion/disorientation, suicidal or homicidal ideation. Musculoskeletal[de-identified] denies any other complaints. Endocrine: Denies any other complaints. Skin: rash is getting better. OBJECTIVE:  Physical Exam:     Constitutional: General Appearance:  nontoxic, in no acute distress. Pulmonary: Respiratory effort: normal; no dyspnea. Psychiatric[de-identified] Pleasant and cooperative.   Oriented to time, place and person. Neurological[de-identified] Speech normal.      Lab Results   Component Value Date/Time    TSH 1.46 01/20/2020 09:03 AM     Lab Results   Component Value Date/Time    Cholesterol, total 164 01/20/2020 09:03 AM    HDL Cholesterol 38 (L) 01/20/2020 09:03 AM    LDL, calculated 98.4 01/20/2020 09:03 AM    VLDL, calculated 27.6 01/20/2020 09:03 AM    Triglyceride 138 01/20/2020 09:03 AM    CHOL/HDL Ratio 4.3 01/20/2020 09:03 AM     Lab Results   Component Value Date/Time    Hemoglobin A1c 8.3 (H) 01/28/2020 10:37 AM    Hemoglobin A1c (POC) 6.3 10/10/2017 09:01 AM     Lab Results   Component Value Date/Time    Sodium 136 01/20/2020 09:03 AM    Potassium 5.0 01/20/2020 09:03 AM    Chloride 103 01/20/2020 09:03 AM    CO2 28 01/20/2020 09:03 AM    Anion gap 5 01/20/2020 09:03 AM    Glucose 240 (H) 01/20/2020 09:03 AM    BUN 18 01/20/2020 09:03 AM    Creatinine 0.79 01/20/2020 09:03 AM    BUN/Creatinine ratio 23 (H) 01/20/2020 09:03 AM    GFR est AA >60 01/20/2020 09:03 AM    GFR est non-AA >60 01/20/2020 09:03 AM    Calcium 9.2 01/20/2020 09:03 AM    Bilirubin, total 0.5 01/20/2020 09:03 AM    AST (SGOT) 22 01/20/2020 09:03 AM    Alk. phosphatase 100 01/20/2020 09:03 AM    Protein, total 7.8 01/20/2020 09:03 AM    Albumin 3.2 (L) 01/20/2020 09:03 AM    Globulin 4.6 (H) 01/20/2020 09:03 AM    A-G Ratio 0.7 (L) 01/20/2020 09:03 AM    ALT (SGPT) 22 01/20/2020 09:03 AM     Lab Results   Component Value Date/Time    WBC 15.1 (H) 01/28/2020 10:37 AM    HGB 13.0 01/28/2020 10:37 AM    HCT 39.2 01/28/2020 10:37 AM    PLATELET 603 26/04/0838 10:37 AM    MCV 86.2 01/28/2020 10:37 AM     Lab Results   Component Value Date/Time    Vitamin D 25-Hydroxy 14.5 (L) 01/20/2020 09:03 AM         ASSESSMENT:     1. Type 2 diabetes mellitus without complication, without long-term current use of insulin ([facility]    2. Essential hypertension    3. Hyperlipidemia, unspecified hyperlipidemia type    4. Hypovitaminosis D    5.  Multiple sclerosis ([facility] PLAN:     Orders Placed This Encounter    Blood Pressure Monitor kit     Pharmacologic Management: Medications reviewed with the patient. Take medications regularly. She declines a Statin. HM: she has given Rx for Tdap. Before the shot, she will make sure with her pharmacy that she has not received it. She is advised to bring records of eye exam.    Discussed the patient's BMI with her. The BMI follow up plan is as follows:   -dietary management education, guidance, and counseling  -encourage exercise  -monitor weight prescribed dietary intake  Weight loss will help with her health issues. Advised about avoidance of Covid-19 virus. Discussed DDx, follow-up & work-up. Discussed risk/benefit & side effect of treatment. Follow up visit as planned, prn sooner. Low salt ADA diet, weightloss & graduated excecise. Check glucose and BP regularly as advised. Health risk from non adherence discussed again. Patient voiced understanding. Follow-up and Dispositions    · Return in about 3 months (around 7/20/2020). Rogelio Fernández is a 60 y.o. female who was evaluated by a video visit encounter for concerns as above. A caregiver was present when appropriate. Due to this being a TeleHealth encounter (During [facility] public health emergency), evaluation of the following organ systems was limited: Vitals/Constitutional/EENT/Resp/CV/GI//MS/Neuro/Skin/Heme-Lymph-Imm. Pursuant to the emergency declaration under the [facility], [facility]5 waiver authority and the [employer] General Act, this Virtual  Visit was conducted, with patient's (and/or legal guardian's) consent, to reduce the patient's risk of exposure to COVID-19 and provide necessary medical care. Services were provided through a video synchronous discussion virtually to substitute for in-person clinic visit.    Patient and provider were located at their individual homes.         Dottie Daniel MD

## 2020-06-22 RX ORDER — METOPROLOL SUCCINATE 100 MG/1
TABLET, EXTENDED RELEASE ORAL
Qty: 90 TAB | Refills: 0 | Status: SHIPPED | OUTPATIENT
Start: 2020-06-22 | End: 2020-09-15

## 2020-09-15 RX ORDER — METOPROLOL SUCCINATE 100 MG/1
TABLET, EXTENDED RELEASE ORAL
Qty: 90 TAB | Refills: 0 | Status: SHIPPED | OUTPATIENT
Start: 2020-09-15

## 2020-12-28 RX ORDER — LISINOPRIL 40 MG/1
TABLET ORAL
Qty: 30 TAB | Refills: 0 | Status: SHIPPED | OUTPATIENT
Start: 2020-12-28

## 2021-01-05 RX ORDER — LISINOPRIL 40 MG/1
TABLET ORAL
Qty: 30 TAB | Refills: 0 | OUTPATIENT
Start: 2021-01-05

## 2021-01-05 NOTE — TELEPHONE ENCOUNTER
There is a note in her chart about follow up due for further refill. \"Refill written for 30 days. Needs follow up now. Please schedule. \"

## 2021-01-20 ENCOUNTER — VIRTUAL VISIT (OUTPATIENT)
Dept: FAMILY MEDICINE CLINIC | Age: 61
End: 2021-01-20
Payer: COMMERCIAL

## 2021-01-20 DIAGNOSIS — E78.5 HYPERLIPIDEMIA, UNSPECIFIED HYPERLIPIDEMIA TYPE: ICD-10-CM

## 2021-01-20 DIAGNOSIS — E11.9 TYPE 2 DIABETES MELLITUS WITHOUT COMPLICATION, WITHOUT LONG-TERM CURRENT USE OF INSULIN (HCC): Primary | ICD-10-CM

## 2021-01-20 DIAGNOSIS — I10 ESSENTIAL HYPERTENSION: ICD-10-CM

## 2021-01-20 DIAGNOSIS — E55.9 HYPOVITAMINOSIS D: ICD-10-CM

## 2021-01-20 PROCEDURE — 99214 OFFICE O/P EST MOD 30 MIN: CPT | Performed by: FAMILY MEDICINE

## 2021-01-20 NOTE — PROGRESS NOTES
Consent:   Deja Garcia, who was seen by synchronous (real-time) audio-video technology, and/or her healthcare decision maker, is aware that this patient-initiated, Telehealth encounter on 1/20/2021 is a billable service, with coverage as determined by her insurance carrier. She is aware that she may receive a bill and has provided verbal consent to proceed: Yes. SUBJECTIVE:  Deja Gacria is a 63y.o. year old female   Chief Complaint   Patient presents with    Diabetes    Hypertension    Cholesterol Problem       History of Present Illness:       She is taking her blood pressure medications regularly and her BP has been in reasonable control. In regards to diabetes, she has not been checking her blood sugar regularly. Also she has not been taking metformin because of nausea from it. She says that she is now following lifestyle modification for diabetes and hypertension and hyperlipidemia. She declines medication for lipids for concern of body ache. She has been seeing neurology regularly for MS. She is on her vitamin D from neurologist.  However she has not been taking it regularly. She is now taking vitamin D supplement regularly. No more gout attack. The rash in her leg has not returned. The regurgitation of food and burping have not returned with lifestyle alone. She feels well and denies any acute complaints. No Systemic, Cardiovascular or Respiratory symptoms.     Past Medical History:   Diagnosis Date    Cephalgia     Diabetes ([address]     Hyperlipemia     mild    Hypertension     IFG (impaired fasting glucose)     MS (multiple sclerosis) (HCC)     Optic neuritis     Tendonitis of shoulder      Past Surgical History:   Procedure Laterality Date    HX HYSTERECTOMY      fibroids and endometriosis        Current Outpatient Medications   Medication Sig    lisinopriL (PRINIVIL, ZESTRIL) 40 mg tablet TAKE 1 TABLET BY MOUTH EVERY DAY    metoprolol succinate (TOPROL-XL) 100 mg tablet TAKE 1 TABLET BY MOUTH EVERY DAY    Blood Pressure Monitor kit Check blood pressure daily.  ocrelizumab (OCREVUS) 30 mg/mL soln injection by IntraVENous route every 6 months.  hydroCHLOROthiazide (HYDRODIURIL) 12.5 mg tablet Take 1 Tab by mouth daily.  ergocalciferol (ERGOCALCIFEROL) 50,000 unit capsule Take 50,000 Units by mouth every seven (7) days.  Blood-Glucose Meter monitoring kit Re: DM .00. Check once daily.  glucose blood VI test strips (BLOOD GLUCOSE TEST) strip Re: DM .00. Check once daily.  Lancets misc Re: DM .00. Check once daily. No current facility-administered medications for this visit. No Known Allergies     Family History   Problem Relation Age of Onset    Cancer Mother         breast    Diabetes Father     Hypertension Father     Heart Disease Father     Diabetes Sister     Diabetes Brother     Hypertension Sister         Social History     Tobacco Use    Smoking status: Never Smoker    Smokeless tobacco: Never Used   Substance Use Topics    Alcohol use: No     Alcohol/week: 0.0 standard drinks       Review of Systems:   Constitutional: No fever, chills, night sweats, malaise, dizziness. Cardiovascular: No angina, palpitations, PND, orthopnea, lightheadedness, edema, claudication. Respiratory: No dyspnea, wheeze, pleurisy, hemoptysis, unusual cough or sputum. Gastrointestinal: No nausea/ vomiting, bowel habit change, pain, melena, hematochezia, anorexia. Neurological: No seizures, speech abnormality, incontinence. Psychiatric: No agitation, confusion/disorientation, suicidal or homicidal ideation. Musculoskeletal[de-identified] denies any other complaints. Endocrine: Denies any other complaints. OBJECTIVE:  Physical Exam:     Constitutional: General Appearance:  Appears well-developed and obese. Nontoxic, in no acute distress. Mental status:  Alert and awake. Oriented to person/place/time. Able to follow commands. Eyes:   Sclera  Normal.   HENT:  Normocephalic, atraumatic. No Facial Asymmetry. No gaze palsy    Pulmonary: Respiratory effort: normal; no dyspnea. Neurological:   Speech normal.      Psychiatric:  Pleasant and cooperative. Normal Affect. No Hallucinations. Musculoskeletal[de-identified]   Neck is supple. Lab Results   Component Value Date/Time    TSH 1.46 01/20/2020 09:03 AM     Lab Results   Component Value Date/Time    Cholesterol, total 164 01/20/2020 09:03 AM    HDL Cholesterol 38 (L) 01/20/2020 09:03 AM    LDL, calculated 98.4 01/20/2020 09:03 AM    VLDL, calculated 27.6 01/20/2020 09:03 AM    Triglyceride 138 01/20/2020 09:03 AM    CHOL/HDL Ratio 4.3 01/20/2020 09:03 AM     Lab Results   Component Value Date/Time    Hemoglobin A1c 8.3 (H) 01/28/2020 10:37 AM    Hemoglobin A1c (POC) 6.3 10/10/2017 09:01 AM     Lab Results   Component Value Date/Time    Sodium 136 01/20/2020 09:03 AM    Potassium 5.0 01/20/2020 09:03 AM    Chloride 103 01/20/2020 09:03 AM    CO2 28 01/20/2020 09:03 AM    Anion gap 5 01/20/2020 09:03 AM    Glucose 240 (H) 01/20/2020 09:03 AM    BUN 18 01/20/2020 09:03 AM    Creatinine 0.79 01/20/2020 09:03 AM    BUN/Creatinine ratio 23 (H) 01/20/2020 09:03 AM    GFR est AA >60 01/20/2020 09:03 AM    GFR est non-AA >60 01/20/2020 09:03 AM    Calcium 9.2 01/20/2020 09:03 AM    Bilirubin, total 0.5 01/20/2020 09:03 AM    Alk.  phosphatase 100 01/20/2020 09:03 AM    Protein, total 7.8 01/20/2020 09:03 AM    Albumin 3.2 (L) 01/20/2020 09:03 AM    Globulin 4.6 (H) 01/20/2020 09:03 AM    A-G Ratio 0.7 (L) 01/20/2020 09:03 AM    ALT (SGPT) 22 01/20/2020 09:03 AM     Lab Results   Component Value Date/Time    WBC 15.1 (H) 01/28/2020 10:37 AM    HGB 13.0 01/28/2020 10:37 AM    HCT 39.2 01/28/2020 10:37 AM    PLATELET 209 79/79/5427 10:37 AM    MCV 86.2 01/28/2020 10:37 AM     Lab Results   Component Value Date/Time    Vitamin D 25-Hydroxy 14.5 (L) 01/20/2020 09:03 AM ASSESSMENT:     1. Type 2 diabetes mellitus without complication, without long-term current use of insulin ([address]    2. Essential hypertension    3. Hyperlipidemia, unspecified hyperlipidemia type    4. Hypovitaminosis D           PLAN:     Orders Placed This Encounter    TSH 3RD GENERATION    VITAMIN D, 25 HYDROXY    LIPID PANEL    METABOLIC PANEL, COMPREHENSIVE    CBC WITH AUTOMATED DIFF    HEMOGLOBIN A1C WITH EAG    URIC ACID    URINALYSIS W/ RFLX MICROSCOPIC    MICROALBUMIN, UR, RAND W/ MICROALB/CREAT RATIO    SITagliptin (JANUVIA) 100 mg tablet     Pharmacologic Management: Medications reviewed with the patient. Will start on Januvia 100 mg daily. Take medications regularly. She declines a Statin. HM:   She is again advised about Tdap, Shingrix. Before the shot, she will make sure with her pharmacy that she has not received it. She says she has been seeing ophthalmologist regularly. Patient is advised about Covid vaccine from the health department/ [facility]. She will get the flu vaccine from the clinic. Diabetic foot exam to be performed at the next in person visit. Discussed DDx, follow-up & work-up. Discussed risk/benefit & side effect of treatment. Follow up with PCP as advised, prn sooner. Low salt ADA diet, weightloss & graduated excecise. Check glucose and BP regularly as advised. Health risk from non adherence discussed again. Patient voiced understanding. Follow-up and Dispositions    · Return in about 3 months (around 4/20/2021). Deja Garcia is a 63 y.o. female who was evaluated by a video visit encounter for concerns as above. A caregiver was present when appropriate. Due to this being a TeleHealth encounter (During \[facility]\""GS-32 public health emergency), evaluation of the following organ systems was limited: Vitals/Constitutional/EENT/Resp/CV/GI//MS/Neuro/Skin/Heme-Lymph-Imm.   Pursuant to the emergency declaration under the [address] Emergencies Act, 1135 waiver authority and the Coronavirus Preparedness and Response Supplemental Appropriations Act, this Virtual  Visit was conducted, with patient's (and/or legal guardian's) consent, to reduce the patient's risk of exposure to COVID-19 and provide necessary medical care. Services were provided through a video synchronous discussion virtually to substitute for in-person clinic visit. Patient and provider were located at their individual homes.         Paola Watts MD

## 2021-01-21 ENCOUNTER — HOSPITAL ENCOUNTER (OUTPATIENT)
Dept: LAB | Age: 61
Discharge: HOME | End: 2021-01-21
Payer: COMMERCIAL

## 2021-01-21 DIAGNOSIS — E11.9 TYPE 2 DIABETES MELLITUS WITHOUT COMPLICATION, WITHOUT LONG-TERM CURRENT USE OF INSULIN (HCC): ICD-10-CM

## 2021-01-21 DIAGNOSIS — E78.5 HYPERLIPIDEMIA, UNSPECIFIED HYPERLIPIDEMIA TYPE: ICD-10-CM

## 2021-01-21 DIAGNOSIS — I10 ESSENTIAL HYPERTENSION: ICD-10-CM

## 2021-01-21 DIAGNOSIS — E55.9 HYPOVITAMINOSIS D: ICD-10-CM

## 2021-01-21 LAB
25(OH)D3 SERPL-MCNC: 34.1 NG/ML (ref 30–100)
ALBUMIN SERPL-MCNC: 3.3 G/DL (ref 3.4–5)
ALBUMIN/GLOB SERPL: 0.8 {RATIO} (ref 0.8–1.7)
ALP SERPL-CCNC: 128 U/L (ref 45–117)
ALT SERPL-CCNC: 33 U/L (ref 13–56)
ANION GAP SERPL CALC-SCNC: 6 MMOL/L (ref 3–18)
APPEARANCE UR: ABNORMAL
AST SERPL-CCNC: 13 U/L (ref 10–38)
BASOPHILS # BLD: 0 K/UL (ref 0–0.1)
BASOPHILS NFR BLD: 0 % (ref 0–2)
BILIRUB SERPL-MCNC: 0.5 MG/DL (ref 0.2–1)
BILIRUB UR QL: NEGATIVE
BUN SERPL-MCNC: 14 MG/DL (ref 7–18)
BUN/CREAT SERPL: 17 (ref 12–20)
CALCIUM SERPL-MCNC: 8.9 MG/DL (ref 8.5–10.1)
CHLORIDE SERPL-SCNC: 102 MMOL/L (ref 100–111)
CHOLEST SERPL-MCNC: 177 MG/DL
CO2 SERPL-SCNC: 30 MMOL/L (ref 21–32)
COLOR UR: YELLOW
CREAT SERPL-MCNC: 0.83 MG/DL (ref 0.6–1.3)
CREAT UR-MCNC: 168 MG/DL (ref 30–125)
DIFFERENTIAL METHOD BLD: NORMAL
EOSINOPHIL # BLD: 0.2 K/UL (ref 0–0.4)
EOSINOPHIL NFR BLD: 2 % (ref 0–5)
ERYTHROCYTE [DISTWIDTH] IN BLOOD BY AUTOMATED COUNT: 13.4 % (ref 11.6–14.5)
EST. AVERAGE GLUCOSE BLD GHB EST-MCNC: 275 MG/DL
GLOBULIN SER CALC-MCNC: 4.1 G/DL (ref 2–4)
GLUCOSE SERPL-MCNC: 293 MG/DL (ref 74–99)
GLUCOSE UR STRIP.AUTO-MCNC: >1000 MG/DL
HBA1C MFR BLD: 11.2 % (ref 4.2–5.6)
HCT VFR BLD AUTO: 44 % (ref 35–45)
HDLC SERPL-MCNC: 38 MG/DL (ref 40–60)
HDLC SERPL: 4.7 {RATIO} (ref 0–5)
HGB BLD-MCNC: 14.2 G/DL (ref 12–16)
HGB UR QL STRIP: NEGATIVE
KETONES UR QL STRIP.AUTO: ABNORMAL MG/DL
LDLC SERPL CALC-MCNC: 108 MG/DL (ref 0–100)
LEUKOCYTE ESTERASE UR QL STRIP.AUTO: NEGATIVE
LIPID PROFILE,FLP: ABNORMAL
LYMPHOCYTES # BLD: 2 K/UL (ref 0.9–3.6)
LYMPHOCYTES NFR BLD: 22 % (ref 21–52)
MCH RBC QN AUTO: 28 PG (ref 24–34)
MCHC RBC AUTO-ENTMCNC: 32.3 G/DL (ref 31–37)
MCV RBC AUTO: 86.8 FL (ref 74–97)
MICROALBUMIN UR-MCNC: 5.6 MG/DL (ref 0–3)
MICROALBUMIN/CREAT UR-RTO: 33 MG/G (ref 0–30)
MONOCYTES # BLD: 0.8 K/UL (ref 0.05–1.2)
MONOCYTES NFR BLD: 8 % (ref 3–10)
NEUTS SEG # BLD: 6.3 K/UL (ref 1.8–8)
NEUTS SEG NFR BLD: 68 % (ref 40–73)
NITRITE UR QL STRIP.AUTO: NEGATIVE
PH UR STRIP: 5 [PH] (ref 5–8)
PLATELET # BLD AUTO: 338 K/UL (ref 135–420)
PMV BLD AUTO: 11.3 FL (ref 9.2–11.8)
POTASSIUM SERPL-SCNC: 4.9 MMOL/L (ref 3.5–5.5)
PROT SERPL-MCNC: 7.4 G/DL (ref 6.4–8.2)
PROT UR STRIP-MCNC: NEGATIVE MG/DL
RBC # BLD AUTO: 5.07 M/UL (ref 4.2–5.3)
SODIUM SERPL-SCNC: 138 MMOL/L (ref 136–145)
SP GR UR REFRACTOMETRY: 1.03 (ref 1–1.03)
TRIGL SERPL-MCNC: 155 MG/DL (ref ?–150)
TSH SERPL DL<=0.05 MIU/L-ACNC: 2.05 UIU/ML (ref 0.36–3.74)
URATE SERPL-MCNC: 5 MG/DL (ref 2.6–7.2)
UROBILINOGEN UR QL STRIP.AUTO: 0.2 EU/DL (ref 0.2–1)
VLDLC SERPL CALC-MCNC: 31 MG/DL
WBC # BLD AUTO: 9.3 K/UL (ref 4.6–13.2)

## 2021-01-21 PROCEDURE — 84443 ASSAY THYROID STIM HORMONE: CPT

## 2021-01-21 PROCEDURE — 36415 COLL VENOUS BLD VENIPUNCTURE: CPT

## 2021-01-21 PROCEDURE — 83036 HEMOGLOBIN GLYCOSYLATED A1C: CPT

## 2021-01-21 PROCEDURE — 82306 VITAMIN D 25 HYDROXY: CPT

## 2021-01-21 PROCEDURE — 84550 ASSAY OF BLOOD/URIC ACID: CPT

## 2021-01-21 PROCEDURE — 80061 LIPID PANEL: CPT

## 2021-01-21 PROCEDURE — 80053 COMPREHEN METABOLIC PANEL: CPT

## 2021-01-21 PROCEDURE — 82043 UR ALBUMIN QUANTITATIVE: CPT

## 2021-01-21 PROCEDURE — 81003 URINALYSIS AUTO W/O SCOPE: CPT

## 2021-01-21 PROCEDURE — 85025 COMPLETE CBC W/AUTO DIFF WBC: CPT

## 2021-01-22 RX ORDER — INSULIN PUMP SYRINGE, 3 ML
EACH MISCELLANEOUS
Qty: 1 KIT | Refills: 0 | Status: SHIPPED | OUTPATIENT
Start: 2021-01-22 | End: 2022-01-12

## 2021-01-22 RX ORDER — IBUPROFEN 200 MG
CAPSULE ORAL
Qty: 200 STRIP | Refills: 0 | Status: SHIPPED | OUTPATIENT
Start: 2021-01-22 | End: 2021-01-28

## 2021-01-22 RX ORDER — LANCETS
EACH MISCELLANEOUS
Qty: 200 PACKAGE | Refills: 0 | Status: SHIPPED | OUTPATIENT
Start: 2021-01-22 | End: 2022-01-12

## 2021-01-22 NOTE — PROGRESS NOTES
Blood and urine glucose are quite high. Hemoglobin A1c for diabetes is significantly high, gradually going up over last 2 years. Now diabetes is affecting the kidneys with elevated urine microalbumin. LDL cholesterol and triglyceride are also in abnormal range. Patient is advised to take her medications regularly, including the newly ordered sitagliptin (Januvia). Since there was problem with the last diabetic medication, will hold adding another diabetic medication to Januvia at this time. However, it is imperative that blood glucose is checked every day, and diabetic diet with exercise as tolerated followed strictly. This will need follow-up with primary care provider in 6 weeks. Keep the record of your blood sugars for that visit. Although resistant to taking medicine for cholesterol, adding a statin drug is recommended. The patient is already on a good dose of lisinopril helping to protect the kidneys from diabetes. New order for blood glucose monitoring sent to  pharmacy.

## 2021-05-10 ENCOUNTER — VIRTUAL VISIT (OUTPATIENT)
Dept: FAMILY MEDICINE CLINIC | Age: 61
End: 2021-05-10
Payer: COMMERCIAL

## 2021-05-10 DIAGNOSIS — I10 ESSENTIAL HYPERTENSION: ICD-10-CM

## 2021-05-10 DIAGNOSIS — E78.5 HYPERLIPIDEMIA, UNSPECIFIED HYPERLIPIDEMIA TYPE: ICD-10-CM

## 2021-05-10 PROCEDURE — 99214 OFFICE O/P EST MOD 30 MIN: CPT | Performed by: INTERNAL MEDICINE

## 2021-05-10 RX ORDER — BLOOD SUGAR DIAGNOSTIC
STRIP MISCELLANEOUS
Qty: 100 PEN NEEDLE | Refills: 1 | Status: SHIPPED | OUTPATIENT
Start: 2021-05-10

## 2021-05-10 RX ORDER — INSULIN GLARGINE 100 [IU]/ML
10 INJECTION, SOLUTION SUBCUTANEOUS DAILY
Qty: 3 PEN | Refills: 1 | Status: SHIPPED | OUTPATIENT
Start: 2021-05-10 | End: 2021-06-07

## 2021-05-10 NOTE — PROGRESS NOTES
HISTORY OF PRESENT ILLNESS  Stella Azevedo is a 63 y.o. female who presents to [facility]. Patient has history of diabetes hypertension hyperlipidemia . Patient's blood sugars are out of control. Patient reports that blood pressure is elevated. Consent:  he and/or  healthcare decision maker is aware that this patient-initiated Telehealth encounter is a billable service, with coverage as determined by her insurance carrier. he is aware that she may receive a bill and has provided verbal consent to proceed: Yes    I was at home while conducting this encounter. Hypertension   The history is provided by the patient and medical records. This is a chronic problem. The problem has been gradually worsening. Pertinent negatives include no chest pain, no orthopnea, no headaches, no peripheral edema, no dizziness and no shortness of breath. There are no associated agents to hypertension. Risk factors include dyslipidemia, diabetes mellitus, obesity and postmenopause. Diabetes  The history is provided by the patient and medical records. This is a chronic problem. The problem has been gradually worsening. Pertinent negatives include no chest pain, no abdominal pain, no headaches and no shortness of breath. The symptoms are aggravated by eating. The symptoms are relieved by medications. Treatments tried: Januvia. Cholesterol Problem  The history is provided by the patient and medical records. This is a chronic problem. Pertinent negatives include no chest pain, no abdominal pain, no headaches and no shortness of breath. Nothing relieves the symptoms. She has tried nothing for the symptoms. No Known Allergies  Current Outpatient Medications on File Prior to Visit   Medication Sig Dispense Refill    OneTouch Ultra Blue Test Strip strip CHECK TWICE DAILY 200 Strip 0    Blood-Glucose Meter monitoring kit Re: 11.29,E11.65,R80.9. Check twice daily. 1 Kit 0    lancets misc Re: 11.29,E11.65,R80.9.   Check twice daily. 200 Package 0    lisinopriL (PRINIVIL, ZESTRIL) 40 mg tablet TAKE 1 TABLET BY MOUTH EVERY DAY 30 Tab 0    metoprolol succinate (TOPROL-XL) 100 mg tablet TAKE 1 TABLET BY MOUTH EVERY DAY 90 Tab 0    Blood Pressure Monitor kit Check blood pressure daily. 1 Kit 0    ocrelizumab (OCREVUS) 30 mg/mL soln injection by IntraVENous route every 6 months.  hydroCHLOROthiazide (HYDRODIURIL) 12.5 mg tablet Take 1 Tab by mouth daily. 90 Tab 3    ergocalciferol (ERGOCALCIFEROL) 50,000 unit capsule Take 50,000 Units by mouth every seven (7) days. 3    [DISCONTINUED] SITagliptin (JANUVIA) 100 mg tablet Take 1 Tab by mouth daily. 90 Tab 0     No current facility-administered medications on file prior to visit.       Past Medical History:   Diagnosis Date    Cephalgia     Diabetes [address] [address]     Hyperlipemia     mild    Hypertension     IFG (impaired fasting glucose)     MS (multiple sclerosis) (HCC)     Optic neuritis     Tendonitis of shoulder      Past Surgical History:   Procedure Laterality Date    HX HYSTERECTOMY      fibroids and endometriosis     Family History   Problem Relation Age of Onset    Cancer Mother         breast    Diabetes Father     Hypertension Father     Heart Disease Father     Diabetes Sister     Diabetes Brother     Hypertension Sister      Social History     Socioeconomic History    Marital status:      Spouse name: Not on file    Number of children: 0    Years of education: 12    Highest education level: Not on file   Occupational History    Occupation:      Employer: OTHER     Comment: [employer]   Social Needs    Financial resource strain: Not on file    Food insecurity     Worry: Not on file     Inability: Not on file   [employer] needs     Medical: Not on file     Non-medical: Not on file   Tobacco Use    Smoking status: Never Smoker    Smokeless tobacco: Never Used   Substance and Sexual Activity    Alcohol use: No     Alcohol/week: 0.0 standard drinks    Drug use: No    Sexual activity: Yes     Partners: Male   Lifestyle    Physical activity     Days per week: Not on file     Minutes per session: Not on file    Stress: Not on file   Relationships    Social connections     Talks on phone: Not on file     Gets together: Not on file     Attends Confucianist service: Not on file     Active member of club or organization: Not on file     Attends meetings of clubs or organizations: Not on file     Relationship status: Not on file    Intimate partner violence     Fear of current or ex partner: Not on file     Emotionally abused: Not on file     Physically abused: Not on file     Forced sexual activity: Not on file   Other Topics Concern     Service No    Blood Transfusions No    Caffeine Concern No    Occupational Exposure No    Hobby Hazards No    Sleep Concern No    Stress Concern No    Weight Concern Yes    Special Diet No    Back Care Yes     Comment: low back    Exercise Yes     Comment: walking    Bike Helmet No    Seat Belt Yes    Self-Exams Yes   Social History Narrative    Not on file         Review of Systems   Constitutional: Negative. Eyes: Negative. Respiratory: Negative. Negative for shortness of breath. Cardiovascular: Negative. Negative for chest pain and orthopnea. Gastrointestinal: Negative for abdominal pain. Musculoskeletal: Negative. Neurological: Negative. Negative for dizziness and headaches. Endo/Heme/Allergies: Negative. Psychiatric/Behavioral: Negative. There were no vitals taken for this visit. Physical Exam  Nursing note reviewed. Constitutional:       Appearance: She is well-developed. HENT:      Head: Normocephalic and atraumatic. Pulmonary:      Effort: Pulmonary effort is normal. No respiratory distress. Musculoskeletal: Normal range of motion. General: No tenderness or deformity. Skin:     General: Skin is dry.       Coloration: Skin is not pale.      Findings: No erythema or rash. Neurological:      Mental Status: She is alert and oriented to person, place, and time. Cranial Nerves: No cranial nerve deficit. Coordination: Coordination normal.   Psychiatric:         Behavior: Behavior normal.         Thought Content: Thought content normal.         Judgment: Judgment normal.         ASSESSMENT and PLAN    ICD-10-CM ICD-9-CM    1. Uncontrolled type 2 diabetes mellitus with microalbuminuria (HCC)  E11.29 250.42 HEMOGLOBIN A1C WITH EAG    E11.65 583.81 CBC WITH AUTOMATED DIFF    L00.9  METABOLIC PANEL, COMPREHENSIVE      MICROALBUMIN, UR, RAND W/ MICROALB/CREAT RATIO      URINALYSIS W/ RFLX MICROSCOPIC   2. Essential hypertension  D03 854.4 METABOLIC PANEL, COMPREHENSIVE      URINALYSIS W/ RFLX MICROSCOPIC   3. Hyperlipidemia, unspecified hyperlipidemia type  E26.6 960.1 METABOLIC PANEL, COMPREHENSIVE      LIPID PANEL   We discussed the expected course, resolution and complications of the diagnosis(es) in detail. Medication risks, benefits, costs, interactions, and alternatives were discussed as indicated. I advised her to contact the office if her condition worsens, changes or fails to improve as anticipated. She expressed understanding with the diagnosis(es) and plan. Pursuant to the emergency declaration under the [facility], 1135 waiver authority and the [employer] Act, this Virtual  Visit was conducted, with patient's consent, to reduce the patient's risk of exposure to COVID-19 and provide continuity of care for an established patient. Services were provided through a video synchronous discussion virtually to substitute for in-person clinic visit. Ariel Berry MD      Follow-up and Dispositions    · Return in about 2 weeks (around 5/24/2021) for Make appointment for vital signs check, Make an appointment to have blood work done.

## 2021-05-10 NOTE — PROGRESS NOTES
Chief Complaint   Patient presents with    Hypertension     196/96 checked at her vascular appointment     Diabetes     bs 292 fasting     Cholesterol Problem     1. Have you been to the ER, urgent care clinic since your last visit? Hospitalized since your last visit? No    2. Have you seen or consulted any other health care providers outside of the [address] [place] [address] since your last visit? Include any pap smears or colon screening. Yes, [facility] last week.      Health Maintenance Due   Topic Date Due    Eye Exam Retinal or Dilated  Never done    COVID-19 Vaccine (1) Never done    DTaP/Tdap/Td series (1 - Tdap) Never done    Shingrix Vaccine Age 50> (1 of 2) Never done    Foot Exam Q1  03/13/2020    A1C test (Diabetic or Prediabetic)  04/21/2021

## 2021-05-12 DIAGNOSIS — E11.9 TYPE 2 DIABETES MELLITUS WITHOUT COMPLICATION, WITHOUT LONG-TERM CURRENT USE OF INSULIN (HCC): Primary | ICD-10-CM

## 2021-05-12 RX ORDER — INSULIN LISPRO 100 [IU]/ML
10 INJECTION, SOLUTION INTRAVENOUS; SUBCUTANEOUS DAILY
Qty: 1 PACKAGE | Refills: 2 | Status: SHIPPED | OUTPATIENT
Start: 2021-05-12 | End: 2021-06-07 | Stop reason: SDUPTHER

## 2021-05-12 RX ORDER — INSULIN ASPART 100 [IU]/ML
10 INJECTION, SOLUTION INTRAVENOUS; SUBCUTANEOUS ONCE
COMMUNITY
End: 2021-05-27 | Stop reason: SDUPTHER

## 2021-05-12 RX ORDER — INSULIN LISPRO 100 [IU]/ML
10 INJECTION, SOLUTION INTRAVENOUS; SUBCUTANEOUS DAILY
COMMUNITY
End: 2021-05-12 | Stop reason: SDUPTHER

## 2021-05-20 RX ORDER — INSULIN ASPART 100 [IU]/ML
INJECTION, SOLUTION INTRAVENOUS; SUBCUTANEOUS
Qty: 5 PEN | Refills: 2 | OUTPATIENT
Start: 2021-05-20

## 2021-05-27 ENCOUNTER — HOSPITAL ENCOUNTER (OUTPATIENT)
Dept: LAB | Age: 61
Discharge: HOME | End: 2021-05-27
Payer: COMMERCIAL

## 2021-05-27 ENCOUNTER — CLINICAL SUPPORT (OUTPATIENT)
Dept: FAMILY MEDICINE CLINIC | Age: 61
End: 2021-05-27

## 2021-05-27 VITALS
HEART RATE: 73 BPM | BODY MASS INDEX: 40.62 KG/M2 | SYSTOLIC BLOOD PRESSURE: 130 MMHG | OXYGEN SATURATION: 99 % | DIASTOLIC BLOOD PRESSURE: 78 MMHG | TEMPERATURE: 98.4 F | WEIGHT: 268 LBS | HEIGHT: 68 IN

## 2021-05-27 DIAGNOSIS — I10 ESSENTIAL HYPERTENSION: Primary | ICD-10-CM

## 2021-05-27 LAB
ALBUMIN SERPL-MCNC: 3.2 G/DL (ref 3.4–5)
ALBUMIN/GLOB SERPL: 0.7 {RATIO} (ref 0.8–1.7)
ALP SERPL-CCNC: 123 U/L (ref 45–117)
ALT SERPL-CCNC: 22 U/L (ref 13–56)
ANION GAP SERPL CALC-SCNC: 5 MMOL/L (ref 3–18)
APPEARANCE UR: CLEAR
AST SERPL-CCNC: 7 U/L (ref 10–38)
BASOPHILS # BLD: 0.1 K/UL (ref 0–0.1)
BASOPHILS NFR BLD: 1 % (ref 0–2)
BILIRUB SERPL-MCNC: 0.4 MG/DL (ref 0.2–1)
BILIRUB UR QL: NEGATIVE
BUN SERPL-MCNC: 16 MG/DL (ref 7–18)
BUN/CREAT SERPL: 21 (ref 12–20)
CALCIUM SERPL-MCNC: 9.2 MG/DL (ref 8.5–10.1)
CHLORIDE SERPL-SCNC: 99 MMOL/L (ref 100–111)
CHOLEST SERPL-MCNC: 172 MG/DL
CO2 SERPL-SCNC: 32 MMOL/L (ref 21–32)
COLOR UR: YELLOW
CREAT SERPL-MCNC: 0.78 MG/DL (ref 0.6–1.3)
CREAT UR-MCNC: 127 MG/DL (ref 30–125)
DIFFERENTIAL METHOD BLD: ABNORMAL
EOSINOPHIL # BLD: 0.3 K/UL (ref 0–0.4)
EOSINOPHIL NFR BLD: 3 % (ref 0–5)
ERYTHROCYTE [DISTWIDTH] IN BLOOD BY AUTOMATED COUNT: 13.1 % (ref 11.6–14.5)
EST. AVERAGE GLUCOSE BLD GHB EST-MCNC: 252 MG/DL
GLOBULIN SER CALC-MCNC: 4.5 G/DL (ref 2–4)
GLUCOSE SERPL-MCNC: 273 MG/DL (ref 74–99)
GLUCOSE UR STRIP.AUTO-MCNC: 250 MG/DL
HBA1C MFR BLD: 10.4 % (ref 4.2–5.6)
HCT VFR BLD AUTO: 43.1 % (ref 35–45)
HDLC SERPL-MCNC: 45 MG/DL (ref 40–60)
HDLC SERPL: 3.8 {RATIO} (ref 0–5)
HGB BLD-MCNC: 13.2 G/DL (ref 12–16)
HGB UR QL STRIP: NEGATIVE
KETONES UR QL STRIP.AUTO: NEGATIVE MG/DL
LDLC SERPL CALC-MCNC: 103.2 MG/DL (ref 0–100)
LEUKOCYTE ESTERASE UR QL STRIP.AUTO: NEGATIVE
LIPID PROFILE,FLP: ABNORMAL
LYMPHOCYTES # BLD: 2.2 K/UL (ref 0.9–3.6)
LYMPHOCYTES NFR BLD: 17 % (ref 21–52)
MCH RBC QN AUTO: 27.4 PG (ref 24–34)
MCHC RBC AUTO-ENTMCNC: 30.6 G/DL (ref 31–37)
MCV RBC AUTO: 89.6 FL (ref 74–97)
MICROALBUMIN UR-MCNC: 2.41 MG/DL (ref 0–3)
MICROALBUMIN/CREAT UR-RTO: 19 MG/G (ref 0–30)
MONOCYTES # BLD: 1.1 K/UL (ref 0.05–1.2)
MONOCYTES NFR BLD: 8 % (ref 3–10)
NEUTS SEG # BLD: 9.3 K/UL (ref 1.8–8)
NEUTS SEG NFR BLD: 71 % (ref 40–73)
NITRITE UR QL STRIP.AUTO: NEGATIVE
PH UR STRIP: 5.5 [PH]
PLATELET # BLD AUTO: 447 K/UL (ref 135–420)
PMV BLD AUTO: 10.2 FL (ref 9.2–11.8)
POTASSIUM SERPL-SCNC: 4.7 MMOL/L (ref 3.5–5.5)
PROT SERPL-MCNC: 7.7 G/DL (ref 6.4–8.2)
PROT UR STRIP-MCNC: NEGATIVE MG/DL
RBC # BLD AUTO: 4.81 M/UL (ref 4.2–5.3)
SODIUM SERPL-SCNC: 136 MMOL/L (ref 136–145)
SP GR UR REFRACTOMETRY: 1.02 (ref 1–1.04)
TRIGL SERPL-MCNC: 119 MG/DL (ref ?–150)
UROBILINOGEN UR QL STRIP.AUTO: 0.2 EU/DL
VLDLC SERPL CALC-MCNC: 23.8 MG/DL
WBC # BLD AUTO: 13.1 K/UL (ref 4.6–13.2)

## 2021-05-27 PROCEDURE — 83036 HEMOGLOBIN GLYCOSYLATED A1C: CPT

## 2021-05-27 PROCEDURE — 85025 COMPLETE CBC W/AUTO DIFF WBC: CPT

## 2021-05-27 PROCEDURE — 80053 COMPREHEN METABOLIC PANEL: CPT

## 2021-05-27 PROCEDURE — 80061 LIPID PANEL: CPT

## 2021-05-27 PROCEDURE — 36415 COLL VENOUS BLD VENIPUNCTURE: CPT

## 2021-05-27 PROCEDURE — 81003 URINALYSIS AUTO W/O SCOPE: CPT

## 2021-05-27 PROCEDURE — 82570 ASSAY OF URINE CREATININE: CPT

## 2021-05-27 NOTE — PROGRESS NOTES
Patient presents today for nurse visit BP check. BP was taken manually feet flat on the floor. Patient is aware if Dr. Lula Davis needs to make any changes we will call her back but keep follow up appointment schedule on 6/7/21.      Visit Vitals  Pulse 73   Temp 98.4 °F (36.9 °C) (Temporal)   Ht 5' 8\" (1.727 m)   Wt 268 lb (121.6 kg)   SpO2 99%   BMI 40.75 kg/m²

## 2021-06-07 ENCOUNTER — VIRTUAL VISIT (OUTPATIENT)
Dept: FAMILY MEDICINE CLINIC | Age: 61
End: 2021-06-07
Payer: COMMERCIAL

## 2021-06-07 DIAGNOSIS — E11.9 TYPE 2 DIABETES MELLITUS WITHOUT COMPLICATION, WITH LONG-TERM CURRENT USE OF INSULIN (HCC): Primary | ICD-10-CM

## 2021-06-07 DIAGNOSIS — Z79.4 TYPE 2 DIABETES MELLITUS WITHOUT COMPLICATION, WITH LONG-TERM CURRENT USE OF INSULIN (HCC): Primary | ICD-10-CM

## 2021-06-07 DIAGNOSIS — I10 ESSENTIAL HYPERTENSION: ICD-10-CM

## 2021-06-07 DIAGNOSIS — E78.5 HYPERLIPIDEMIA, UNSPECIFIED HYPERLIPIDEMIA TYPE: ICD-10-CM

## 2021-06-07 PROCEDURE — 99214 OFFICE O/P EST MOD 30 MIN: CPT | Performed by: INTERNAL MEDICINE

## 2021-06-07 RX ORDER — INSULIN GLARGINE 100 [IU]/ML
15 INJECTION, SOLUTION SUBCUTANEOUS DAILY
Qty: 3 PEN | Refills: 1 | Status: SHIPPED | OUTPATIENT
Start: 2021-06-07

## 2021-06-07 RX ORDER — SIMVASTATIN 10 MG/1
10 TABLET, FILM COATED ORAL
Qty: 90 TABLET | Refills: 1 | Status: SHIPPED | OUTPATIENT
Start: 2021-06-07

## 2021-06-07 NOTE — PROGRESS NOTES
HISTORY OF PRESENT ILLNESS  Baby Adjutant is a 57 y.o. female who presents today for follow-up on diabetes and hypertension. Labs have been reviewed with patient. It is noted that her cholesterol is elevated. Patient states that fasting blood sugars average around 250. This morning it was 320. She is no longer using Januvia. Consent:  he and/or  healthcare decision maker is aware that this patient-initiated Telehealth encounter is a billable service, with coverage as determined by her insurance carrier. he is aware that she may receive a bill and has provided verbal consent to proceed: Yes    I was at home while conducting this encounter. .  Hypertension   The history is provided by the patient and medical records. This is a chronic problem. The problem has been gradually worsening. Pertinent negatives include no chest pain, no orthopnea, no headaches, no peripheral edema, no dizziness and no shortness of breath. There are no associated agents to hypertension. Risk factors include dyslipidemia, diabetes mellitus, obesity and postmenopause. Diabetes  The history is provided by the patient and medical records. This is a chronic problem. The problem has been gradually worsening. Pertinent negatives include no chest pain, no abdominal pain, no headaches and no shortness of breath. The symptoms are aggravated by eating. The symptoms are relieved by medications. Treatments tried: Januvia. Cholesterol Problem  The history is provided by the patient and medical records. This is a chronic problem. Pertinent negatives include no chest pain, no abdominal pain, no headaches and no shortness of breath. Nothing relieves the symptoms. She has tried nothing for the symptoms.      No Known Allergies  Current Outpatient Medications on File Prior to Visit   Medication Sig Dispense Refill    Insulin Needles, Disposable, (Comfort EZ Pen Needles) 32 gauge x 1/4\" ndle Used to inject insulin 100 Pen Needle 1    OneTouch Ultra Blue Test Strip strip CHECK TWICE DAILY 200 Strip 0    Blood-Glucose Meter monitoring kit Re: 11.29,E11.65,R80.9. Check twice daily. 1 Kit 0    lancets misc Re: 11.29,E11.65,R80.9. Check twice daily. 200 Package 0    lisinopriL (PRINIVIL, ZESTRIL) 40 mg tablet TAKE 1 TABLET BY MOUTH EVERY DAY 30 Tab 0    metoprolol succinate (TOPROL-XL) 100 mg tablet TAKE 1 TABLET BY MOUTH EVERY DAY 90 Tab 0    Blood Pressure Monitor kit Check blood pressure daily. 1 Kit 0    ocrelizumab (OCREVUS) 30 mg/mL soln injection by IntraVENous route every 6 months.  hydroCHLOROthiazide (HYDRODIURIL) 12.5 mg tablet Take 1 Tab by mouth daily. 90 Tab 3    ergocalciferol (ERGOCALCIFEROL) 50,000 unit capsule Take 50,000 Units by mouth every seven (7) days. 3    [DISCONTINUED] insulin lispro (HUMALOG) 100 unit/mL kwikpen 10 Units by SubCUTAneous route daily. 10 Units subcutaneous daily 1 Package 2    [DISCONTINUED] SITagliptin (JANUVIA) 100 mg tablet Take 1 Tab by mouth daily. (Patient not taking: Reported on 6/7/2021) 90 Tab 0    [DISCONTINUED] insulin glargine (LANTUS,BASAGLAR) 100 unit/mL (3 mL) inpn 10 Units by SubCUTAneous route daily. 3 Pen 1     No current facility-administered medications on file prior to visit.      Past Medical History:   Diagnosis Date    Cephalgia     Diabetes ([facility] [address]     Hyperlipemia     mild    Hypertension     IFG (impaired fasting glucose)     [facility] sclerosis) ([facility])     Optic neuritis     Tendonitis of shoulder      Past Surgical History:   Procedure Laterality Date    HX HYSTERECTOMY      fibroids and endometriosis     Family History   Problem Relation Age of Onset    Cancer Mother         breast    Diabetes Father     Hypertension Father     Heart Disease Father     Diabetes Sister     Diabetes Brother     Hypertension Sister      Social History     Socioeconomic History    Marital status:      Spouse name: Not on file    Number of children: 0    Years of education: 12    Highest education level: Not on file   Occupational History    Occupation:      Employer: OTHER     Comment: [employer]   Tobacco Use    Smoking status: Never Smoker    Smokeless tobacco: Never Used   Vaping Use    Vaping Use: Never used   Substance and Sexual Activity    Alcohol use: No     Alcohol/week: 0.0 standard drinks    Drug use: No    Sexual activity: Yes     Partners: Male   Other Topics Concern     Service No    Blood Transfusions No    Caffeine Concern No    Occupational Exposure No    Hobby Hazards No    Sleep Concern No    Stress Concern No    Weight Concern Yes    Special Diet No    Back Care Yes     Comment: low back    Exercise Yes     Comment: walking    Bike Helmet No    Seat Belt Yes    Self-Exams Yes   Social History Narrative    Not on file     Social Determinants of Health     Financial Resource Strain:     Difficulty of Paying Living Expenses:    Food Insecurity:     Worried About Running Out of Food in the Last Year:     Ran Out of Food in the Last Year:    Transportation Needs:     Lack of Transportation (Medical):  Lack of Transportation (Non-Medical):    Physical Activity:     Days of Exercise per Week:     Minutes of Exercise per Session:    Stress:     Feeling of Stress :    Social Connections:     Frequency of Communication with Friends and Family:     Frequency of Social Gatherings with Friends and Family:     Attends Sikhism Services:     Active Member of Clubs or Organizations:     Attends Club or Organization Meetings:     Marital Status:    Intimate Partner Violence:     Fear of Current or Ex-Partner:     Emotionally Abused:     Physically Abused:     Sexually Abused:        Review of Systems   Constitutional: Negative. Eyes: Negative. Respiratory: Negative. Negative for shortness of breath. Cardiovascular: Negative. Negative for chest pain and orthopnea.    Gastrointestinal: Negative for abdominal pain. Musculoskeletal: Negative. Neurological: Negative. Negative for dizziness and headaches. Endo/Heme/Allergies: Negative. Psychiatric/Behavioral: Negative. There were no vitals taken for this visit. Physical Exam  Nursing note reviewed. Constitutional:       Appearance: She is well-developed. HENT:      Head: Normocephalic and atraumatic. Pulmonary:      Effort: Pulmonary effort is normal. No respiratory distress. Musculoskeletal:         General: No tenderness or deformity. Normal range of motion. Skin:     General: Skin is dry. Coloration: Skin is not pale. Findings: No erythema or rash. Neurological:      Mental Status: She is alert and oriented to person, place, and time. Cranial Nerves: No cranial nerve deficit. Coordination: Coordination normal.   Psychiatric:         Behavior: Behavior normal.         Thought Content: Thought content normal.         Judgment: Judgment normal.         ASSESSMENT and PLAN    ICD-10-CM ICD-9-CM    1. Type 2 diabetes mellitus without complication, with long-term current use of insulin ([facility])  E11.9 250.00 insulin glargine (LANTUS,BASAGLAR) 100 unit/mL (3 mL) inpn    Z79.4 V58.67    2. Hyperlipidemia, unspecified hyperlipidemia type  E78.5 272.4 simvastatin (ZOCOR) 10 mg tablet   3. Essential hypertension  I10 401.9    We discussed the expected course, resolution and complications of the diagnosis(es) in detail. Medication risks, benefits, costs, interactions, and alternatives were discussed as indicated. I advised her to contact the office if her condition worsens, changes or fails to improve as anticipated. She expressed understanding with the diagnosis(es) and plan.      Pursuant to the emergency declaration under the [facility], [facility] waiver authority and the [employer] General Act, this Virtual  Visit was conducted, with patient's consent, to reduce the patient's risk of exposure to COVID-19 and provide continuity of care for an established patient. Services were provided through a video synchronous discussion virtually to substitute for in-person clinic visit. Grace Shipman MD        Follow-up and Dispositions    · Return in about 2 weeks (around 6/21/2021).

## 2021-06-07 NOTE — PROGRESS NOTES
Chief Complaint   Patient presents with    Diabetes     bs 320 fasting, questions about medication januvia     Hypertension     130/78 at nurse visit     Cholesterol Problem     1. Have you been to the ER, urgent care clinic since your last visit? Hospitalized since your last visit? No    2. Have you seen or consulted any other health care providers outside of the [address] [place] [address] since your last visit? Include any pap smears or colon screening.  No     Health Maintenance Due   Topic Date Due    Eye Exam Retinal or Dilated  Never done    COVID-19 Vaccine (1) Never done    DTaP/Tdap/Td series (1 - Tdap) Never done    Shingrix Vaccine Age 50> (1 of 2) Never done    Foot Exam Q1  03/13/2020

## 2021-06-21 ENCOUNTER — VIRTUAL VISIT (OUTPATIENT)
Dept: FAMILY MEDICINE CLINIC | Age: 61
End: 2021-06-21
Payer: COMMERCIAL

## 2021-06-21 DIAGNOSIS — Z79.4 TYPE 2 DIABETES MELLITUS WITHOUT COMPLICATION, WITH LONG-TERM CURRENT USE OF INSULIN (HCC): ICD-10-CM

## 2021-06-21 DIAGNOSIS — I10 ESSENTIAL HYPERTENSION: Primary | ICD-10-CM

## 2021-06-21 DIAGNOSIS — E78.5 HYPERLIPIDEMIA, UNSPECIFIED HYPERLIPIDEMIA TYPE: ICD-10-CM

## 2021-06-21 DIAGNOSIS — E11.9 TYPE 2 DIABETES MELLITUS WITHOUT COMPLICATION, WITH LONG-TERM CURRENT USE OF INSULIN (HCC): ICD-10-CM

## 2021-06-21 PROCEDURE — 99214 OFFICE O/P EST MOD 30 MIN: CPT | Performed by: INTERNAL MEDICINE

## 2021-06-21 NOTE — PROGRESS NOTES
Chief Complaint   Patient presents with    Diabetes     bs 210 fasting have not taken basaglar, questions about it     Hypertension    Cholesterol Problem     1. Have you been to the ER, urgent care clinic since your last visit? Hospitalized since your last visit? No    2. Have you seen or consulted any other health care providers outside of the [facility] since your last visit? Include any pap smears or colon screening.  No     Health Maintenance Due   Topic Date Due    Eye Exam Retinal or Dilated  Never done    COVID-19 Vaccine (1) Never done    DTaP/Tdap/Td series (1 - Tdap) Never done    Shingrix Vaccine Age 50> (1 of 2) Never done    Foot Exam Q1  03/13/2020

## 2021-06-21 NOTE — PROGRESS NOTES
HISTORY OF PRESENT ILLNESS  Duane Messick is a 57 y.o. female who presents for follow-up on diabetes hypertension and hyperlipidemia. Patient states she is unsure of how to inject insulin. Fasting blood sugars have been in the 200s. .  Diabetes  The history is provided by the patient and medical records. This is a chronic problem. The problem has been gradually worsening. Pertinent negatives include no chest pain, no abdominal pain, no headaches and no shortness of breath. The symptoms are aggravated by eating. The symptoms are relieved by medications. Treatments tried: Januvia. Hypertension   The history is provided by the patient and medical records. This is a chronic problem. The problem has been gradually worsening. Pertinent negatives include no chest pain, no orthopnea, no headaches, no peripheral edema, no dizziness and no shortness of breath. There are no associated agents to hypertension. Risk factors include dyslipidemia, diabetes mellitus, obesity and postmenopause. Cholesterol Problem  The history is provided by the patient and medical records. This is a chronic problem. Pertinent negatives include no chest pain, no abdominal pain, no headaches and no shortness of breath. Nothing relieves the symptoms. She has tried nothing for the symptoms. No Known Allergies  Current Outpatient Medications on File Prior to Visit   Medication Sig Dispense Refill    SITagliptin (Januvia) 100 mg tablet Take 100 mg by mouth daily.  simvastatin (ZOCOR) 10 mg tablet Take 1 Tablet by mouth nightly. 90 Tablet 1    insulin glargine (LANTUS,BASAGLAR) 100 unit/mL (3 mL) inpn 15 Units by SubCUTAneous route daily. 3 Pen 1    Insulin Needles, Disposable, (Comfort EZ Pen Needles) 32 gauge x 1/4\" ndle Used to inject insulin 100 Pen Needle 1    OneTouch Ultra Blue Test Strip strip CHECK TWICE DAILY 200 Strip 0    Blood-Glucose Meter monitoring kit Re: 11.29,E11.65,R80.9. Check twice daily.  1 Kit 0    lancets misc Re: 11.29,E11.65,R80.9. Check twice daily. 200 Package 0    lisinopriL (PRINIVIL, ZESTRIL) 40 mg tablet TAKE 1 TABLET BY MOUTH EVERY DAY 30 Tab 0    metoprolol succinate (TOPROL-XL) 100 mg tablet TAKE 1 TABLET BY MOUTH EVERY DAY 90 Tab 0    ocrelizumab (OCREVUS) 30 mg/mL soln injection by IntraVENous route every 6 months.  hydroCHLOROthiazide (HYDRODIURIL) 12.5 mg tablet Take 1 Tab by mouth daily. 90 Tab 3    ergocalciferol (ERGOCALCIFEROL) 50,000 unit capsule Take 50,000 Units by mouth every seven (7) days. 3    [DISCONTINUED] Blood Pressure Monitor kit Check blood pressure daily. 1 Kit 0     No current facility-administered medications on file prior to visit.      Past Medical History:   Diagnosis Date    Cephalgia     Diabetes ([facility] [address]     Hyperlipemia     mild    Hypertension     IFG (impaired fasting glucose)     MS (multiple sclerosis) (HCC)     Optic neuritis     Tendonitis of shoulder      Past Surgical History:   Procedure Laterality Date    HX HYSTERECTOMY      fibroids and endometriosis     Family History   Problem Relation Age of Onset    Cancer Mother         breast    Diabetes Father     Hypertension Father     Heart Disease Father     Diabetes Sister     Diabetes Brother     Hypertension Sister      Social History     Socioeconomic History    Marital status:      Spouse name: Not on file    Number of children: 0    Years of education: 12    Highest education level: Not on file   Occupational History    Occupation:      Employer: OTHER     Comment: [employer]   Tobacco Use    Smoking status: Never Smoker    Smokeless tobacco: Never Used   Vaping Use    Vaping Use: Never used   Substance and Sexual Activity    Alcohol use: No     Alcohol/week: 0.0 standard drinks    Drug use: No    Sexual activity: Yes     Partners: Male   Other Topics Concern     Service No    Blood Transfusions No    Caffeine Concern No    Occupational Exposure No    Hobby Hazards No    Sleep Concern No    Stress Concern No    Weight Concern Yes    Special Diet No    Back Care Yes     Comment: low back    Exercise Yes     Comment: walking    Bike Helmet No    Seat Belt Yes    Self-Exams Yes   Social History Narrative    Not on file     Social Determinants of Health     Financial Resource Strain:     Difficulty of Paying Living Expenses:    Food Insecurity:     Worried About Running Out of Food in the Last Year:     Ran Out of Food in the Last Year:    Transportation Needs:     Lack of Transportation (Medical):  Lack of Transportation (Non-Medical):    Physical Activity:     Days of Exercise per Week:     Minutes of Exercise per Session:    Stress:     Feeling of Stress :    Social Connections:     Frequency of Communication with Friends and Family:     Frequency of Social Gatherings with Friends and Family:     Attends Latter day Services:     Active Member of Clubs or Organizations:     Attends Club or Organization Meetings:     Marital Status:    Intimate Partner Violence:     Fear of Current or Ex-Partner:     Emotionally Abused:     Physically Abused:     Sexually Abused:        Review of Systems   Constitutional: Negative. Eyes: Negative. Respiratory: Negative. Negative for shortness of breath. Cardiovascular: Negative. Negative for chest pain and orthopnea. Gastrointestinal: Negative for abdominal pain. Musculoskeletal: Negative. Neurological: Negative. Negative for dizziness and headaches. Endo/Heme/Allergies: Negative. Psychiatric/Behavioral: Negative. There were no vitals taken for this visit. Physical Exam  Nursing note reviewed. Constitutional:       Appearance: She is well-developed. HENT:      Head: Normocephalic and atraumatic. Pulmonary:      Effort: Pulmonary effort is normal. No respiratory distress. Musculoskeletal:         General: No tenderness or deformity.  Normal range of motion. Skin:     General: Skin is dry. Coloration: Skin is not pale. Findings: No erythema or rash. Neurological:      Mental Status: She is alert and oriented to person, place, and time. Cranial Nerves: No cranial nerve deficit. Coordination: Coordination normal.   Psychiatric:         Behavior: Behavior normal.         Thought Content: Thought content normal.         Judgment: Judgment normal.         ASSESSMENT and PLAN    ICD-10-CM ICD-9-CM    1. Essential hypertension  I10 401.9    2. Type 2 diabetes mellitus without complication, with long-term current use of insulin (HCC)  E11.9 250.00     Z79.4 V58.67    3. Hyperlipidemia, unspecified hyperlipidemia type  E78.5 272.4    We discussed the expected course, resolution and complications of the diagnosis(es) in detail. Medication risks, benefits, costs, interactions, and alternatives were discussed as indicated. I advised her to contact the office if her condition worsens, changes or fails to improve as anticipated. She expressed understanding with the diagnosis(es) and plan. Pursuant to the emergency declaration under the [facility], [facility] waiver authority and the [employer] Act, this Virtual  Visit was conducted, with patient's consent, to reduce the patient's risk of exposure to COVID-19 and provide continuity of care for an established patient. Services were provided through a video synchronous discussion virtually to substitute for in-person clinic visit.     Sravanthi Pickens MD

## 2021-08-17 ENCOUNTER — TELEPHONE (OUTPATIENT)
Dept: FAMILY MEDICINE CLINIC | Age: 61
End: 2021-08-17

## 2021-08-17 NOTE — TELEPHONE ENCOUNTER
Patient was called regarding A1C 10.4 (5/27/21), check if she will continue in this office and schedule follow up. No answer. Left message to return call.

## 2021-12-22 ENCOUNTER — TELEPHONE (OUTPATIENT)
Dept: FAMILY MEDICINE CLINIC | Age: 61
End: 2021-12-22

## 2021-12-22 NOTE — TELEPHONE ENCOUNTER
----- Message from Marty Evan sent at 12/21/2021  7:15 PM EST -----  Subject: Message to Provider    QUESTIONS  Information for Provider? Pt left message yesterday needing to know next   steps for a Covid positive test. Urgent! Please call, pt inquiring   antibody testing Monoclonol . Please call pt right away! Pt is also   considered high risk! .   ---------------------------------------------------------------------------  --------------  CALL BACK INFO  What is the best way for the office to contact you? OK to leave message on   voicemail  Preferred Call Back Phone Number? [phone]  ---------------------------------------------------------------------------  --------------  SCRIPT ANSWERS  Relationship to Patient?  Self

## 2021-12-22 NOTE — TELEPHONE ENCOUNTER
EMR reviewed.  Patient was a no show to establish care appointment with Juan Daniel Stokes NP on 11-24-21

## 2021-12-28 ENCOUNTER — TELEPHONE (OUTPATIENT)
Dept: FAMILY MEDICINE CLINIC | Age: 61
End: 2021-12-28

## 2021-12-28 NOTE — TELEPHONE ENCOUNTER
Called patient to inform her we can not fill out paperwork for her due to us not seeing her as a patient as of yet. Patient understood.

## 2021-12-28 NOTE — TELEPHONE ENCOUNTER
----- Message from Elana Styles sent at 12/28/2021 11:11 AM EST -----  Subject: Message to Provider    QUESTIONS  Information for Provider? Est care in Jan 1/25, earliest that she could be   seen. Needs paperwork filled out in the meantime for COVID short term. currently in the hospital being treated for it now, at [facility].   ---------------------------------------------------------------------------  --------------  [address]  What is the best way for the office to contact you? OK to leave message on   voicemail  Preferred Call Back Phone Number? [phone]  ---------------------------------------------------------------------------  --------------  SCRIPT ANSWERS  Relationship to Patient?  Self

## 2022-01-12 DIAGNOSIS — E11.9 TYPE 2 DIABETES MELLITUS WITHOUT COMPLICATION, WITH LONG-TERM CURRENT USE OF INSULIN (HCC): ICD-10-CM

## 2022-01-12 DIAGNOSIS — Z79.4 TYPE 2 DIABETES MELLITUS WITHOUT COMPLICATION, WITH LONG-TERM CURRENT USE OF INSULIN (HCC): ICD-10-CM

## 2022-01-12 RX ORDER — BLOOD SUGAR DIAGNOSTIC
STRIP MISCELLANEOUS
Qty: 200 STRIP | Refills: 0 | Status: SHIPPED | OUTPATIENT
Start: 2022-01-12 | End: 2022-07-24

## 2022-01-12 RX ORDER — BLOOD-GLUCOSE CONTROL, NORMAL
EACH MISCELLANEOUS
Qty: 200 EACH | Refills: 0 | Status: SHIPPED | OUTPATIENT
Start: 2022-01-12

## 2022-01-12 RX ORDER — BLOOD-GLUCOSE CONTROL, NORMAL
EACH MISCELLANEOUS 2 TIMES DAILY
COMMUNITY
Start: 2021-01-22 | End: 2022-01-12 | Stop reason: SDUPTHER

## 2022-01-12 RX ORDER — BLOOD-GLUCOSE METER
EACH MISCELLANEOUS
COMMUNITY
Start: 2021-01-25

## 2022-01-12 RX ORDER — BLOOD SUGAR DIAGNOSTIC
STRIP MISCELLANEOUS 2 TIMES DAILY
COMMUNITY
Start: 2021-01-22 | End: 2022-01-12 | Stop reason: SDUPTHER

## 2022-01-12 NOTE — TELEPHONE ENCOUNTER
Last seen Dr. Colleen Aguirre @ [facility] 6/21/21    Last filled   Strips 1/28/21 qty 200 w/ 0 refills  Lancets 1/22/21 200 w/ 0 refills    Future Appointments   Date Time Provider Loyd Chopra   1/25/2022  2:00 PM Sophia Griffiths DO [facility]

## 2022-01-12 NOTE — TELEPHONE ENCOUNTER
Pt is requesting a refill on her one touch delica plus lancets and test strips. Please advise.      Future Appointments   Date Time Provider Loyd Manisti   1/25/2022  2:00 PM Sophia Griffiths DO [facility]

## 2024-08-05 NOTE — LETTER
NOTIFICATION RETURN TO WORK  
 
5/7/2018 8:21 AM 
 
Ms. Roger Mejias [address] [facility] [address] [facility] [address] To Whom It May Concern: 
 
Roger Mejias is currently under the care of [address] [place] [address]. She will return to work on 5/8/18. If there are questions or concerns please contact our office.  
 
 
 
Sincerely, 
 
 
Barb Norman MD 
 
                                
 
 Detail Level: Simple Additional Notes: Patient is deferring removal today due to upcoming vacation. RTO in 2 weeks for shave biopsy. \\n\\nSpot is ~1.5 cm. Will remove small portion from center of lesion (favor pigmented AK r/o MM) Render Risk Assessment In Note?: no Additional Notes: Deferring removal until after vacation. ddx nevus r/o MM